# Patient Record
Sex: MALE | Race: WHITE | Employment: OTHER | URBAN - METROPOLITAN AREA
[De-identification: names, ages, dates, MRNs, and addresses within clinical notes are randomized per-mention and may not be internally consistent; named-entity substitution may affect disease eponyms.]

---

## 2017-02-16 RX ORDER — EMTRICITABINE AND TENOFOVIR DISOPROXIL FUMARATE 200; 300 MG/1; MG/1
TABLET, FILM COATED ORAL
Qty: 90 TAB | Refills: 4 | Status: SHIPPED | OUTPATIENT
Start: 2017-02-16 | End: 2018-03-20 | Stop reason: ALTCHOICE

## 2017-03-20 ENCOUNTER — HOSPITAL ENCOUNTER (OUTPATIENT)
Dept: LAB | Age: 67
Discharge: HOME | End: 2017-03-20
Payer: MEDICARE

## 2017-03-20 ENCOUNTER — OFFICE VISIT (OUTPATIENT)
Dept: HEMATOLOGY | Age: 67
End: 2017-03-20

## 2017-03-20 ENCOUNTER — TELEPHONE (OUTPATIENT)
Dept: HEMATOLOGY | Age: 67
End: 2017-03-20

## 2017-03-20 VITALS
RESPIRATION RATE: 16 BRPM | TEMPERATURE: 98 F | HEART RATE: 86 BPM | HEIGHT: 68 IN | OXYGEN SATURATION: 98 % | WEIGHT: 183 LBS | SYSTOLIC BLOOD PRESSURE: 129 MMHG | DIASTOLIC BLOOD PRESSURE: 78 MMHG | BODY MASS INDEX: 27.74 KG/M2

## 2017-03-20 DIAGNOSIS — B18.1 CHRONIC HEPATITIS B (HCC): ICD-10-CM

## 2017-03-20 DIAGNOSIS — B18.1 CHRONIC HEPATITIS B (HCC): Primary | ICD-10-CM

## 2017-03-20 LAB
ALBUMIN SERPL BCP-MCNC: 4.1 G/DL (ref 3.4–5)
ALBUMIN/GLOB SERPL: 1.5 {RATIO} (ref 0.8–1.7)
ALP SERPL-CCNC: 90 U/L (ref 45–117)
ALT SERPL-CCNC: 45 U/L (ref 16–61)
ANION GAP BLD CALC-SCNC: 9 MMOL/L (ref 3–18)
AST SERPL W P-5'-P-CCNC: 23 U/L (ref 15–37)
BASOPHILS # BLD AUTO: 0 K/UL (ref 0–0.06)
BASOPHILS # BLD: 1 % (ref 0–2)
BILIRUB DIRECT SERPL-MCNC: 0.2 MG/DL (ref 0–0.2)
BILIRUB SERPL-MCNC: 0.5 MG/DL (ref 0.2–1)
BUN SERPL-MCNC: 19 MG/DL (ref 7–18)
BUN/CREAT SERPL: 27 (ref 12–20)
CALCIUM SERPL-MCNC: 8.8 MG/DL (ref 8.5–10.1)
CHLORIDE SERPL-SCNC: 106 MMOL/L (ref 100–108)
CO2 SERPL-SCNC: 30 MMOL/L (ref 21–32)
CREAT SERPL-MCNC: 0.7 MG/DL (ref 0.6–1.3)
DIFFERENTIAL METHOD BLD: ABNORMAL
EOSINOPHIL # BLD: 0.4 K/UL (ref 0–0.4)
EOSINOPHIL NFR BLD: 8 % (ref 0–5)
ERYTHROCYTE [DISTWIDTH] IN BLOOD BY AUTOMATED COUNT: 12.8 % (ref 11.6–14.5)
GLOBULIN SER CALC-MCNC: 2.7 G/DL (ref 2–4)
GLUCOSE SERPL-MCNC: 89 MG/DL (ref 74–99)
HCT VFR BLD AUTO: 41.9 % (ref 36–48)
HGB BLD-MCNC: 14.4 G/DL (ref 13–16)
LYMPHOCYTES # BLD AUTO: 26 % (ref 21–52)
LYMPHOCYTES # BLD: 1.2 K/UL (ref 0.9–3.6)
MCH RBC QN AUTO: 33.9 PG (ref 24–34)
MCHC RBC AUTO-ENTMCNC: 34.4 G/DL (ref 31–37)
MCV RBC AUTO: 98.6 FL (ref 74–97)
MONOCYTES # BLD: 0.2 K/UL (ref 0.05–1.2)
MONOCYTES NFR BLD AUTO: 5 % (ref 3–10)
NEUTS SEG # BLD: 2.7 K/UL (ref 1.8–8)
NEUTS SEG NFR BLD AUTO: 60 % (ref 40–73)
PLATELET # BLD AUTO: 137 K/UL (ref 135–420)
PMV BLD AUTO: 8.6 FL (ref 9.2–11.8)
POTASSIUM SERPL-SCNC: 4.2 MMOL/L (ref 3.5–5.5)
PROT SERPL-MCNC: 6.8 G/DL (ref 6.4–8.2)
RBC # BLD AUTO: 4.25 M/UL (ref 4.7–5.5)
SODIUM SERPL-SCNC: 145 MMOL/L (ref 136–145)
WBC # BLD AUTO: 4.5 K/UL (ref 4.6–13.2)

## 2017-03-20 PROCEDURE — 80076 HEPATIC FUNCTION PANEL: CPT | Performed by: NURSE PRACTITIONER

## 2017-03-20 PROCEDURE — 80048 BASIC METABOLIC PNL TOTAL CA: CPT | Performed by: NURSE PRACTITIONER

## 2017-03-20 PROCEDURE — 36415 COLL VENOUS BLD VENIPUNCTURE: CPT | Performed by: NURSE PRACTITIONER

## 2017-03-20 PROCEDURE — 82107 ALPHA-FETOPROTEIN L3: CPT | Performed by: NURSE PRACTITIONER

## 2017-03-20 PROCEDURE — 85025 COMPLETE CBC W/AUTO DIFF WBC: CPT | Performed by: NURSE PRACTITIONER

## 2017-03-20 PROCEDURE — 87517 HEPATITIS B DNA QUANT: CPT | Performed by: NURSE PRACTITIONER

## 2017-03-20 NOTE — MR AVS SNAPSHOT
Visit Information Date & Time Provider Department Dept. Phone Encounter #  
 3/20/2017  1:30 PM Monika Mcintosh NP [facility] [place] of [address] [place] [address] [id number] Follow-up Instructions Return in about 6 months (around 9/20/2017). Upcoming Health Maintenance Date Due Hepatitis C Screening 1950 DTaP/Tdap/Td series (1 - Tdap) 1/20/1973 FOBT Q 1 YEAR AGE 50-75 1/20/2002 ZOSTER VACCINE AGE 60> 1/20/2012 INFLUENZA AGE 9 TO ADULT 8/1/2016 GLAUCOMA SCREENING Q2Y 1/20/2017 Pneumococcal 65+ Low/Medium Risk (1 of 2 - PCV13) 1/20/2017 MEDICARE YEARLY EXAM 1/20/2017 Allergies as of 3/20/2017  Review Complete On: 3/20/2017 By: Scottie Zapata Severity Noted Reaction Type Reactions Fish Derived  09/08/2015    Nausea and Vomiting Current Immunizations  Never Reviewed No immunizations on file. Not reviewed this visit You Were Diagnosed With   
  
 Codes Comments Chronic hepatitis B ([facility]    -  Primary ICD-10-CM: B18.1 ICD-9-CM: 070.32 Vitals BP Pulse Temp Resp Height(growth percentile) Weight(growth percentile) 129/78 (BP 1 Location: Left arm, BP Patient Position: Sitting) 86 98 °F (36.7 °C) (Tympanic) 16 5' 8\" (1.727 m) 183 lb (83 kg) SpO2 BMI Smoking Status 98% 27.83 kg/m2 Never Smoker Vitals History BMI and BSA Data Body Mass Index Body Surface Area  
 27.83 kg/m 2 2 m 2 Preferred Pharmacy Pharmacy Name Phone Matthew Dunn, [facility] [phone] Your Updated Medication List  
  
   
This list is accurate as of: 3/20/17  1:50 PM.  Always use your most recent med list.  
  
  
  
  
 [place] Curd Take  by mouth. ALLOPURINOL PO Take  by mouth. CLARITIN PO Take  by mouth. DIOVAN PO Take  by mouth.  
  
 emtricitabine-tenofovir (TDF) 200-300 mg per tablet Commonly known as:  Timbo Julio TAKE ONE TABLET DAILY FISH OIL CONCENTRATE PO Take  by mouth. LEVOTHROID PO Take  by mouth.  
  
 multivitamin capsule Take 1 Cap by mouth daily. NIASPAN EXTENDED-RELEASE PO Take  by mouth. TRAZODONE PO Take  by mouth. Follow-up Instructions Return in about 6 months (around 9/20/2017). To-Do List   
 03/20/2017 Lab:  AFP WITH AFP-L3%   
  
 03/20/2017 Lab:  CBC WITH AUTOMATED DIFF   
  
 03/20/2017 Lab:  HEPATIC FUNCTION PANEL   
  
 03/20/2017 Lab:  HEPATITIS B, QT, PCR   
  
 03/20/2017 Lab:  METABOLIC PANEL, BASIC   
  
 03/20/2017 Imaging:  [facility]! Dear Tony Bruce: Thank you for requesting a [employer] account. Our records indicate that you already have an active [employer] account. You can access your account anytime at [email or web address] Did you know that you can access your hospital and ER discharge instructions at any time in [employer]? You can also review all of your test results from your hospital stay or ER visit. Additional Information If you have questions, please visit the Frequently Asked Questions section of the [employer] website at [email or web address] Remember, [employer] is NOT to be used for urgent needs. For medical emergencies, dial 911. Now available from your iPhone and Android! Please provide this summary of care documentation to your next provider. Your primary care clinician is listed as CHEY LLOYD. If you have any questions after today's visit, please call [phone].

## 2017-03-20 NOTE — PROGRESS NOTES
2 Karen Reed MD April G Patrice, NP [address], NP        at [address] [facility], [address] [address].     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 The active problem list, all pertinent past medical history, medications, liver histology, endoscopic studies, radiologic findings and laboratory findings related to the liver disorder were reviewed with the patient. Mr. Radha Rolon has been feeling well since his last office visit six months ago. Today he has no physical complaints. Mr. Radha Rolon continues to take Truvada without any noticeable side effects. His HBV DNA has been less than 20 IU on Truvada and his transaminases normal.  He completes all daily living activities without any functional limitations. He denies any of the following symptoms: noted fatigue, diffuse abdominal pain, nausea, vomiting, changes in bowel habitis, arthralgias, myalgias, yellowing of the eyes, yellowing of the skin, itchng, dark urine, problems concentrating, focusing or staying on task, swelling of the abdomen, swelling of the lower extremities, hematemesis and hematochezia. Patient Active Problem List    Diagnosis Date Noted    Chronic hepatitis B. Treated with Pegasys from 1/30/04 - 07/15/04.  Hypothyroidism     HBV. Participated in a clinical trial utilizing emtricitabine and Clevudine, relapse after treatment.  HBV.  started Epivir 08/05 with only a 2 log drop in HBV DNA as of 03/06.   Adefovir substituted 06/06     Hypertension     Glaucoma     Hypercholesteremia     Gout      Current Outpatient Prescriptions   Medication Sig Dispense Refill    emtricitabine-tenofovir, TDF, (TRUVADA) 200-300 mg per tablet TAKE ONE TABLET DAILY 90 Tab 4    VALSARTAN (DIOVAN PO) Take  by mouth.  LORATADINE (CLARITIN PO) Take  by mouth.  TRAZODONE HCL (TRAZODONE PO) Take  by mouth.  NAPROXEN SODIUM (ALEVE PO) Take  by mouth.  LEVOTHYROXINE SODIUM (LEVOTHROID PO) Take  by mouth.  NIACIN (NIASPAN EXTENDED-RELEASE PO) Take  by mouth.  OMEGA-3 FATTY ACIDS (FISH OIL CONCENTRATE PO) Take  by mouth.  ALLOPURINOL PO Take  by mouth.  multivitamin capsule Take 1 Cap by mouth daily. Allergies   Allergen Reactions    Fish Derived Nausea and Vomiting      SYSTEM REVIEW NOT RELATED TO LIVER DISEASE OR REVIEWED ABOVE:  Constitution systems: Negative for fever, chills, weight gain, weight loss. Eyes: Negative for visual changes. ENT: Negative for sore throat, painful swallowing. Respiratory: Negative for cough, hemoptysis, SOB. Cardiology: Negative for chest pain, palpitations. GI:  Negative for constipation or diarrhea. : Negative for urinary frequency, dysuria, hematuria, nocturia. Skin: Negative for rash. Hematology: Negative for easy bruising, blood clots. Musculo-skelatal: Negative for back pain, muscle pain, weakness. Neurologic: Negative for headaches, dizziness, vertigo, memory problems not related to HE. Psychology: Negative for anxiety, depression. Family/Social History:  He does not drink alcohol and has history of intranasal drug use. He was possibly exposed to hepatitis B sometime in the 1970s. His family is unremarkable for liver disease. He is single with no children. He is a retired .       Objective:     Visit Vitals    /78 (BP 1 Location: Left arm, BP Patient Position: Sitting)    Pulse 86    Temp 98 °F (36.7 °C) (Tympanic)    Resp 16    Ht 5' 8\" (1.727 m)    Wt 183 lb (83 kg)    SpO2 98%    BMI 27.83 kg/m2       General appearance: no distress  Eyes: sclera anicteric  ENT: no oral lesions, thyroid normal  Nodes: no adenopathy  Skin: no spider angiomata, jaundice, palmar erythema or xanthalasma  Respiratory: clear to auscultation bilaterally  Cardiovascular: regular heart rate, no murmurs, no JVD, no edema  Abdomen: soft, non-tender, liver size normal to percussion/palpation. Spleen not palpable. No obvious ascites  Extremities: no muscle wasting, no gross arthritic changes  : not examined  Neurologic: alert and oriented, cranial nerves grossly intact, no asterixis    LAB    [facility]iver [facility] Latest Ref Rng & Units 9/20/2016 3/8/2016 9/8/2015   WBC 4.6 - 13.2 K/uL 4.3 (L) 3.2 (L) 3.8   ANC 1.8 - 8.0 K/UL 2.6 2.1 2.4   HGB 13.0 - 16.0 g/dL 15.3 14.5 14.8    - 420 K/uL 154 126 (L) 139 (L)   AST 15 - 37 U/L 16 15 18   ALT 16 - 61 U/L 29 33 28   Alk Phos 45 - 117 U/L 86 98 110   Bili, Total 0.2 - 1.0 MG/DL 0.4 0.5 0.6   Bili, Direct 0.0 - 0.2 MG/DL 0.1 0.1 0.19   Albumin 3.4 - 5.0 g/dL 4.3 4.2 4.8   BUN 7.0 - 18 MG/DL 20 (H) 20 (H) 17   Creat 0.6 - 1.3 MG/DL 0.80 0.81 0.86   Na 136 - 145 mmol/L 142 142 142   K 3.5 - 5.5 mmol/L 3.8 3.8 4.0   Cl 100 - 108 mmol/L 101 103 99   CO2 21 - 32 mmol/L 31 27 26   Glucose 74 - 99 mg/dL 85 94 113 (H)       Serologies and Other Pertinent Laboratories:  09/10:  HB e antigen negative, HB e antibody positive, HB surface antigen positive, HB surface antibody negative. Liver Biopsy:  2000:  Knodell score 13 (5789). 2002:  Bridging fibrosis with characteristics suggestive of WADSWORTH. Endoscopic Procedures:  None available. Radiology:  01/10:  Abdominal ultrasound - liver is diffusely echogenic consistent with fatty infiltration. No focal hepatic abnormalities. 01/12:  Abdominal ultrasound - liver is diffusely echogenic consistent with fatty infiltration. No focal hepatic abnormalities. 01/13:  Abdominal ultrasound - liver is diffusely echogenic consistent with fatty infiltration.   No focal hepatic abnormalities. 01/2014: Abdominal ultrasound - liver is diffusely echogenic consistent with fatty infiltration. No focal hepatic abnormalities. 01/2015:  Abdominal ultrasound - liver is diffusely echogenic consistent with fatty infiltration. No focal hepatic abnormalities. 10/2015: Abdominal ultrasound - liver is diffusely echogenic consistent with fatty infiltration. No focal hepatic abnormalities. 3/2016:  Abdominal ultrasound. Moderately increased hepatic echogenicity likely related to steatosis and/or underlying chronic liver disease. No evidence of a liver mass. 9/2016:  Ultrasound of the abdomen. There is increased echogenicity of the liver, similar to prior study. There is a new hypoechoic area within the right lobe the liver near the gallbladder fossa measuring 1.3 x 1.3 x 0.5 cm. Normal direction of blood flow in the portal vein. The liver measures 18.5 cm in length. 10/2016:  MRI of the liver. Hepatic steatosis. Small area of focal fatty sparing adjacent to gallbladder  fossa, very likely accounts for the finding seen on ultrasound. 2. 3 tiny hepatic lesions are too small to definitively characterize, though have features suggestive of benign hemangiomas. Other Testing:  None available. Assessment/Plan:     1. 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 Currently on Truvada. HBV DNA less that 20 iu/ml at last office visit. CBC, BMP, hepatic panel, and HBV DNA ordered today. Continue Truvada. In January 2017 we will switch him to Viread when it becomes generic and will be cheaper. 2.  [address] screening. AFP and abdominal ultrasound ordered today. 3.  Alcohol counseling provided. 4. The need for vaccination against viral hepatitis A will be assessed with serologic and instituted as appropriate. 5. All of the above issues were discussed with the patient. All questions were answered. The patient expressed a clear understanding of the above.     6.  Dr. Anaya Campbell was available during this visit. 7.  Follow-up at the [place] [address] in 6 months.     [address],   [facility] [place] of [facility] [place] Edwin Larios, [address]  [address]  [phone]

## 2017-03-21 LAB
AFP L3 MFR SERPL: NORMAL % (ref 0–9.9)
AFP SERPL-MCNC: 3.1 NG/ML (ref 0–8)
HBV DNA SERPL NAA+PROBE-ACNC: NORMAL IU/ML
HBV DNA SERPL NAA+PROBE-LOG IU: NORMAL LOG10IU/ML
TEST INFORMATION: NORMAL

## 2017-04-11 ENCOUNTER — HOSPITAL ENCOUNTER (OUTPATIENT)
Dept: ULTRASOUND IMAGING | Age: 67
Discharge: HOME | End: 2017-04-11
Attending: NURSE PRACTITIONER
Payer: MEDICARE

## 2017-04-11 DIAGNOSIS — B18.1 CHRONIC HEPATITIS B (HCC): ICD-10-CM

## 2017-04-11 PROCEDURE — 76705 ECHO EXAM OF ABDOMEN: CPT

## 2017-09-20 ENCOUNTER — OFFICE VISIT (OUTPATIENT)
Dept: HEMATOLOGY | Age: 67
End: 2017-09-20

## 2017-09-20 ENCOUNTER — HOSPITAL ENCOUNTER (OUTPATIENT)
Dept: LAB | Age: 67
Discharge: HOME | End: 2017-09-20
Payer: MEDICARE

## 2017-09-20 VITALS
TEMPERATURE: 96.4 F | BODY MASS INDEX: 27.74 KG/M2 | DIASTOLIC BLOOD PRESSURE: 76 MMHG | RESPIRATION RATE: 16 BRPM | SYSTOLIC BLOOD PRESSURE: 129 MMHG | OXYGEN SATURATION: 97 % | HEIGHT: 68 IN | HEART RATE: 79 BPM | WEIGHT: 183 LBS

## 2017-09-20 DIAGNOSIS — B19.10 HEP B W/O COMA: Primary | ICD-10-CM

## 2017-09-20 DIAGNOSIS — B19.10 HEP B W/O COMA: ICD-10-CM

## 2017-09-20 LAB
ALBUMIN SERPL-MCNC: 3.9 G/DL (ref 3.4–5)
ALBUMIN/GLOB SERPL: 1.4 {RATIO} (ref 0.8–1.7)
ALP SERPL-CCNC: 87 U/L (ref 45–117)
ALT SERPL-CCNC: 36 U/L (ref 16–61)
ANION GAP SERPL CALC-SCNC: 5 MMOL/L (ref 3–18)
AST SERPL-CCNC: 22 U/L (ref 15–37)
BASOPHILS # BLD: 0 K/UL (ref 0–0.06)
BASOPHILS NFR BLD: 0 % (ref 0–2)
BILIRUB DIRECT SERPL-MCNC: 0.2 MG/DL (ref 0–0.2)
BILIRUB SERPL-MCNC: 0.5 MG/DL (ref 0.2–1)
BUN SERPL-MCNC: 23 MG/DL (ref 7–18)
BUN/CREAT SERPL: 29 (ref 12–20)
CALCIUM SERPL-MCNC: 8.5 MG/DL (ref 8.5–10.1)
CHLORIDE SERPL-SCNC: 104 MMOL/L (ref 100–108)
CO2 SERPL-SCNC: 30 MMOL/L (ref 21–32)
CREAT SERPL-MCNC: 0.8 MG/DL (ref 0.6–1.3)
DIFFERENTIAL METHOD BLD: ABNORMAL
EOSINOPHIL # BLD: 0.3 K/UL (ref 0–0.4)
EOSINOPHIL NFR BLD: 7 % (ref 0–5)
ERYTHROCYTE [DISTWIDTH] IN BLOOD BY AUTOMATED COUNT: 13.1 % (ref 11.6–14.5)
GLOBULIN SER CALC-MCNC: 2.8 G/DL (ref 2–4)
GLUCOSE SERPL-MCNC: 97 MG/DL (ref 74–99)
HCT VFR BLD AUTO: 39.7 % (ref 36–48)
HGB BLD-MCNC: 13.6 G/DL (ref 13–16)
LYMPHOCYTES # BLD: 1.2 K/UL (ref 0.9–3.6)
LYMPHOCYTES NFR BLD: 29 % (ref 21–52)
MCH RBC QN AUTO: 33.3 PG (ref 24–34)
MCHC RBC AUTO-ENTMCNC: 34.3 G/DL (ref 31–37)
MCV RBC AUTO: 97.3 FL (ref 74–97)
MONOCYTES # BLD: 0.3 K/UL (ref 0.05–1.2)
MONOCYTES NFR BLD: 7 % (ref 3–10)
NEUTS SEG # BLD: 2.3 K/UL (ref 1.8–8)
NEUTS SEG NFR BLD: 57 % (ref 40–73)
PLATELET # BLD AUTO: 148 K/UL (ref 135–420)
PMV BLD AUTO: 8.7 FL (ref 9.2–11.8)
POTASSIUM SERPL-SCNC: 3.4 MMOL/L (ref 3.5–5.5)
PROT SERPL-MCNC: 6.7 G/DL (ref 6.4–8.2)
RBC # BLD AUTO: 4.08 M/UL (ref 4.7–5.5)
SODIUM SERPL-SCNC: 139 MMOL/L (ref 136–145)
WBC # BLD AUTO: 4 K/UL (ref 4.6–13.2)

## 2017-09-20 PROCEDURE — 87517 HEPATITIS B DNA QUANT: CPT | Performed by: NURSE PRACTITIONER

## 2017-09-20 PROCEDURE — 80076 HEPATIC FUNCTION PANEL: CPT | Performed by: NURSE PRACTITIONER

## 2017-09-20 PROCEDURE — 85025 COMPLETE CBC W/AUTO DIFF WBC: CPT | Performed by: NURSE PRACTITIONER

## 2017-09-20 PROCEDURE — 82107 ALPHA-FETOPROTEIN L3: CPT | Performed by: NURSE PRACTITIONER

## 2017-09-20 PROCEDURE — 80048 BASIC METABOLIC PNL TOTAL CA: CPT | Performed by: NURSE PRACTITIONER

## 2017-09-20 PROCEDURE — 36415 COLL VENOUS BLD VENIPUNCTURE: CPT | Performed by: NURSE PRACTITIONER

## 2017-09-20 RX ORDER — AMLODIPINE BESYLATE 5 MG/1
TABLET ORAL
COMMUNITY
Start: 2017-07-19

## 2017-09-20 RX ORDER — TENOFOVIR DISOPROXIL FUMARATE 300 MG/1
300 TABLET, FILM COATED ORAL DAILY
Qty: 90 TAB | Refills: 3 | Status: SHIPPED | OUTPATIENT
Start: 2017-09-20 | End: 2018-08-08 | Stop reason: SDUPTHER

## 2017-09-20 RX ORDER — METRONIDAZOLE 10 MG/G
GEL TOPICAL
COMMUNITY
Start: 2017-09-08

## 2017-09-20 NOTE — MR AVS SNAPSHOT
Visit Information Date & Time Provider Department Dept. Phone Encounter #  
 9/20/2017 11:00 AM XAVI Romovägen 13 of [facility] [id number] Follow-up Instructions Return in about 6 months (around 3/20/2018). Upcoming Health Maintenance Date Due Hepatitis C Screening 1950 DTaP/Tdap/Td series (1 - Tdap) 1/20/1973 FOBT Q 1 YEAR AGE 50-75 1/20/2002 ZOSTER VACCINE AGE 60> 11/20/2011 GLAUCOMA SCREENING Q2Y 1/20/2017 Pneumococcal 65+ Low/Medium Risk (1 of 2 - PCV13) 1/20/2017 MEDICARE YEARLY EXAM 1/20/2017 INFLUENZA AGE 9 TO ADULT 8/1/2017 Allergies as of 9/20/2017  Review Complete On: 9/20/2017 By: Denise Barakat Severity Noted Reaction Type Reactions Fish Derived  09/08/2015    Nausea and Vomiting Current Immunizations  Never Reviewed No immunizations on file. Not reviewed this visit You Were Diagnosed With   
  
 Codes Comments Hep B w/o coma    -  Primary ICD-10-CM: B19.10 ICD-9-CM: 070.30 Vitals BP Pulse Temp Resp Height(growth percentile) 129/76 (BP 1 Location: Left arm, BP Patient Position: Sitting) 79 96.4 °F (35.8 °C) (Tympanic) 16 5' 8\" (1.727 m) Weight(growth percentile) SpO2 BMI Smoking Status 183 lb (83 kg) 97% 27.83 kg/m2 Never Smoker BMI and BSA Data Body Mass Index Body Surface Area  
 27.83 kg/m 2 2 m 2 Preferred Pharmacy Pharmacy Name Phone [address] [facility] [phone] Your Updated Medication List  
  
   
This list is accurate as of: 9/20/17 11:17 AM.  Always use your most recent med list.  
  
  
  
  
 [place] Schiller Take  by mouth. ALLOPURINOL PO Take  by mouth. amLODIPine 5 mg tablet Commonly known as:  Sherrye Acron CLARITIN PO Take  by mouth. DIOVAN PO Take  by mouth. emtricitabine-tenofovir (TDF) 200-300 mg per tablet Commonly known as:  Eritre[place] Drones TAKE ONE TABLET DAILY FISH OIL CONCENTRATE PO Take  by mouth. LEVOTHROID PO Take  by mouth.  
  
 metroNIDAZOLE 1 % Glwp  
  
 multivitamin capsule Take 1 Cap by mouth daily. NIASPAN EXTENDED-RELEASE PO Take  by mouth. tenofovir DISOPROXIL FUMARATE 300 mg tablet Commonly known as:  Mark Gens Take 1 Tab by mouth daily. TRAZODONE PO Take  by mouth. Prescriptions Printed Refills  
 tenofovir DISOPROXIL FUMARATE (VIREAD) 300 mg tablet 3 Sig: Take 1 Tab by mouth daily. Class: Print Route: Oral  
  
Follow-up Instructions Return in about 6 months (around 3/20/2018). To-Do List   
 09/20/2017 Lab:  AFP WITH AFP-L3%   
  
 09/20/2017 Lab:  CBC WITH AUTOMATED DIFF   
  
 09/20/2017 Lab:  HEPATIC FUNCTION PANEL   
  
 09/20/2017 Lab:  HEPATITIS B, QT, PCR   
  
 09/20/2017 Lab:  METABOLIC PANEL, BASIC   
  
 10/01/2017 Imaging:  US [facility] Introducing [facility] & HEALTH SERVICES! Dear Karla Chan: Thank you for requesting a [employer] account. Our records indicate that you already have an active [employer] account. You can access your account anytime at [email or web address] Did you know that you can access your hospital and ER discharge instructions at any time in [employer]? You can also review all of your test results from your hospital stay or ER visit. Additional Information If you have questions, please visit the Frequently Asked Questions section of the [employer] website at [email or web address] Remember, [employer] is NOT to be used for urgent needs. For medical emergencies, dial 911. Now available from your iPhone and Android! Please provide this summary of care documentation to your next provider. Your primary care clinician is listed as Maggi Sloan.  If you have any questions after today's visit, please call [phone].

## 2017-09-20 NOTE — PROGRESS NOTES
[address] MD David, [address]       XAVI Mendosa PA-C Lauree Funk, [facility]   XAVI Medina NP        at [address]     [address] [place] [address], [address] [address]     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 The active problem list, all pertinent past medical history, medications, liver histology, endoscopic studies, radiologic findings and laboratory findings related to the liver disorder were reviewed with the patient. Most recent was performed with ultrasound in 04/2017. Diffuse hepatic echogenicity suggesting either hepatic steatosis or other  chronic hepatocellular disease. Stable small area of hypoechogenicity along gallbladder fossa, likely focal fatty sparing. The most recent laboratory studies indicate that the liver transaminases are normal, alkaline phosphatase is normal, tests of hepatic synthetic and metabolic function are normal, and the platelet count is normal.      HBV is being treated with Truvada. HBV DNA in 03/2017 was undetectable. The patient has no complaints which can be attributed to liver disease. The patient completes all daily activities without any functional limitations.  The patient has not experienced fatigue, fevers, chills, shortness of breath, chest pain, pain in the right side over the liver, diffuse abdominal pain, nausea, vomiting, constipation, diarrhrea, dry eyes, dry mouth, arthralgias, myalgias, yellowing of the eyes or skin, itching, dark urine, problems concentrating, swelling of the abdomen, swelling of the lower extremities, hematemesis, or hematochezia. Patient Active Problem List    Diagnosis Date Noted    Chronic hepatitis B. Treated with Pegasys from 1/30/04 - 07/15/04.  Hypothyroidism     HBV. Participated in a clinical trial utilizing emtricitabine and Clevudine, relapse after treatment.  HBV.  started Epivir 08/05 with only a 2 log drop in HBV DNA as of 03/06. Adefovir substituted 06/06     Hypertension     Glaucoma     Hypercholesteremia     Gout      Current Outpatient Prescriptions   Medication Sig Dispense Refill    amLODIPine (NORVASC) 5 mg tablet       emtricitabine-tenofovir, TDF, (TRUVADA) 200-300 mg per tablet TAKE ONE TABLET DAILY 90 Tab 4    VALSARTAN (DIOVAN PO) Take  by mouth.  LORATADINE (CLARITIN PO) Take  by mouth.  TRAZODONE HCL (TRAZODONE PO) Take  by mouth.  NAPROXEN SODIUM (ALEVE PO) Take  by mouth.  LEVOTHYROXINE SODIUM (LEVOTHROID PO) Take  by mouth.  NIACIN (NIASPAN EXTENDED-RELEASE PO) Take  by mouth.  OMEGA-3 FATTY ACIDS (FISH OIL CONCENTRATE PO) Take  by mouth.  ALLOPURINOL PO Take  by mouth.  multivitamin capsule Take 1 Cap by mouth daily.  metroNIDAZOLE 1 % glwp        Allergies   Allergen Reactions    Fish Derived Nausea and Vomiting      SYSTEM REVIEW NOT RELATED TO LIVER DISEASE OR REVIEWED ABOVE:  Constitution systems: Negative for fever, chills, weight gain, weight loss. Eyes: Negative for visual changes. ENT: Negative for sore throat, painful swallowing. Respiratory: Negative for cough, hemoptysis, SOB. Cardiology: Negative for chest pain, palpitations. GI:  Negative for constipation or diarrhea. : Negative for urinary frequency, dysuria, hematuria, nocturia. Skin: Negative for rash. Hematology: Negative for easy bruising, blood clots. Musculo-skelatal: Negative for back pain, muscle pain, weakness. Neurologic: Negative for headaches, dizziness, vertigo, memory problems not related to HE.   Psychology: Negative for anxiety, depression. Family/Social History:  He does not drink alcohol and has history of intranasal drug use. He was possibly exposed to hepatitis B sometime in the 1970s. His family is unremarkable for liver disease. He is single with no children. He is a retired . Retired in 2005. Objective:     Visit Vitals    /76 (BP 1 Location: Left arm, BP Patient Position: Sitting)    Pulse 79    Temp 96.4 °F (35.8 °C) (Tympanic)    Resp 16    Ht 5' 8\" (1.727 m)    Wt 183 lb (83 kg)    SpO2 97%    BMI 27.83 kg/m2       General appearance: no distress  Eyes: sclera anicteric  ENT: no oral lesions, thyroid normal  Nodes: no adenopathy  Skin: no spider angiomata, jaundice, palmar erythema or xanthalasma  Respiratory: clear to auscultation bilaterally  Cardiovascular: regular heart rate, no murmurs, no JVD, no edema  Abdomen: soft, non-tender, liver size normal to percussion/palpation. Spleen not palpable.  No obvious ascites  Extremities: no muscle wasting, no gross arthritic changes  : not examined  Neurologic: alert and oriented, cranial nerves grossly intact, no asterixis    LABORATORY STUDIES:  [facility] [place] of [address] [place] [address] 3/20/2017 9/20/2016 3/8/2016   WBC 4.6 - 13.2 K/uL 4.5 (L) 4.3 (L) 3.2 (L)   ANC 1.8 - 8.0 K/UL 2.7 2.6 2.1   HGB 13.0 - 16.0 g/dL 14.4 15.3 14.5    - 420 K/uL 137 154 126 (L)   AST 15 - 37 U/L 23 16 15   ALT 16 - 61 U/L 45 29 33   Alk Phos 45 - 117 U/L 90 86 98   Bili, Total 0.2 - 1.0 MG/DL 0.5 0.4 0.5   Bili, Direct 0.0 - 0.2 MG/DL 0.2 0.1 0.1   Albumin 3.4 - 5.0 g/dL 4.1 4.3 4.2   BUN 7.0 - 18 MG/DL 19 (H) 20 (H) 20 (H)   Creat 0.6 - 1.3 MG/DL 0.70 0.80 0.81   Na 136 - 145 mmol/L 145 142 142   K 3.5 - 5.5 mmol/L 4.2 3.8 3.8   Cl 100 - 108 mmol/L 106 101 103   CO2 21 - 32 mmol/L 30 31 27   Glucose 74 - 99 mg/dL 89 85 94     Serologies and Other Pertinent Laboratories:  09/10:  HB e antigen negative, HB e antibody positive, HB surface antigen positive, HB surface antibody negative. Liver Biopsy:  2000:  Knodell score 13 (9752). 2002:  Bridging fibrosis with characteristics suggestive of WADSWORTH. Endoscopic Procedures:  None available. Radiology:  01/10:  Abdominal ultrasound - liver is diffusely echogenic consistent with fatty infiltration. No focal hepatic abnormalities. 01/12:  Abdominal ultrasound - liver is diffusely echogenic consistent with fatty infiltration. No focal hepatic abnormalities. 01/13:  Abdominal ultrasound - liver is diffusely echogenic consistent with fatty infiltration. No focal hepatic abnormalities. 01/2014: Abdominal ultrasound - liver is diffusely echogenic consistent with fatty infiltration. No focal hepatic abnormalities. 01/2015:  Abdominal ultrasound - liver is diffusely echogenic consistent with fatty infiltration. No focal hepatic abnormalities. 10/2015: Abdominal ultrasound - liver is diffusely echogenic consistent with fatty infiltration. No focal hepatic abnormalities. 3/2016:  Abdominal ultrasound. Moderately increased hepatic echogenicity likely related to steatosis and/or underlying chronic liver disease. No evidence of a liver mass. 9/2016:  Ultrasound of the abdomen. There is increased echogenicity of the liver, similar to prior study. There is a new hypoechoic area within the right lobe the liver near the gallbladder fossa measuring 1.3 x 1.3 x 0.5 cm. Normal direction of blood flow in the portal vein. The liver measures 18.5 cm in length. 10/2016:  MRI of the liver. Hepatic steatosis. Small area of focal fatty sparing adjacent to gallbladder  fossa, very likely accounts for the finding seen on ultrasound. 2. 3 tiny hepatic lesions are too small to definitively characterize, though have features suggestive of benign hemangiomas. 04/2017. Ultrasound of the liver.   Diffuse hepatic echogenicity suggesting either hepatic steatosis or other chronic hepatocellular disease. Other Testing:  None available. 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 The most recent laboratory studies indicate that the liver transaminases are normal, alkaline phosphatase is normal, tests of hepatic synthetic and metabolic function are normal, and the platelet count is normal.  Will perform laboratory testing to monitor liver function and degree of liver injury. This will include hepatic panel, a CBC w/ diff, a BMP, an AFP-L3%, and an HBV DNA.      HBV. HBV is being treated with Truvada. HBV DNA in 03/2017 was undetectable. He would to switch to Viread for cost effectiveness. He states that he has \"couple of months of Truvada left\". I provided him with a prescription for Viread to be filled when he compleetes the Truvada. [address] screening. I explained that he should [address] screening with ultrasound and AFP-L3%  every 6 months henceforth. Alcohol counseling provided. The need for vaccination against viral hepatitis A will be assessed with serologic and instituted as appropriate. All of the above issues were discussed with the patient. All questions were answered. The patient expressed a clear understanding of the above. Dr. Narendra Crespo was available during this visit. Follow-up at the [facility] in 6 months.     Sulma Neely NP   Liver [place] of [address] [place] [address], [address] [place] [address]   [phone]

## 2017-09-21 LAB
AFP L3 MFR SERPL: NORMAL % (ref 0–9.9)
AFP SERPL-MCNC: 2.1 NG/ML (ref 0–8)
HBV DNA SERPL NAA+PROBE-ACNC: NORMAL IU/ML
HBV DNA SERPL NAA+PROBE-LOG IU: NORMAL LOG10IU/ML
TEST INFORMATION: NORMAL

## 2017-09-26 ENCOUNTER — HOSPITAL ENCOUNTER (OUTPATIENT)
Dept: ULTRASOUND IMAGING | Age: 67
Discharge: HOME | End: 2017-09-26
Payer: MEDICARE

## 2017-09-26 DIAGNOSIS — B19.10 HEP B W/O COMA: ICD-10-CM

## 2017-09-26 PROCEDURE — 0346T US ABD LTD W ELASTOGRAPHY: CPT

## 2018-03-20 ENCOUNTER — OFFICE VISIT (OUTPATIENT)
Dept: HEMATOLOGY | Age: 68
End: 2018-03-20

## 2018-03-20 ENCOUNTER — HOSPITAL ENCOUNTER (OUTPATIENT)
Dept: LAB | Age: 68
Discharge: HOME | End: 2018-03-20
Payer: MEDICARE

## 2018-03-20 VITALS
DIASTOLIC BLOOD PRESSURE: 75 MMHG | WEIGHT: 168 LBS | BODY MASS INDEX: 25.46 KG/M2 | RESPIRATION RATE: 26 BRPM | HEIGHT: 68 IN | SYSTOLIC BLOOD PRESSURE: 121 MMHG | HEART RATE: 87 BPM

## 2018-03-20 DIAGNOSIS — B19.10 HEP B W/O COMA: ICD-10-CM

## 2018-03-20 DIAGNOSIS — B19.10 HEP B W/O COMA: Primary | ICD-10-CM

## 2018-03-20 LAB
ALBUMIN SERPL-MCNC: 3.8 G/DL (ref 3.4–5)
ALBUMIN/GLOB SERPL: 1.4 {RATIO} (ref 0.8–1.7)
ALP SERPL-CCNC: 76 U/L (ref 45–117)
ALT SERPL-CCNC: 38 U/L (ref 16–61)
ANION GAP SERPL CALC-SCNC: 10 MMOL/L (ref 3–18)
AST SERPL-CCNC: 16 U/L (ref 15–37)
BASOPHILS # BLD: 0 K/UL (ref 0–0.06)
BASOPHILS NFR BLD: 1 % (ref 0–2)
BILIRUB DIRECT SERPL-MCNC: 0.1 MG/DL (ref 0–0.2)
BILIRUB SERPL-MCNC: 0.4 MG/DL (ref 0.2–1)
BUN SERPL-MCNC: 24 MG/DL (ref 7–18)
BUN/CREAT SERPL: 26 (ref 12–20)
CALCIUM SERPL-MCNC: 8.8 MG/DL (ref 8.5–10.1)
CHLORIDE SERPL-SCNC: 106 MMOL/L (ref 100–108)
CO2 SERPL-SCNC: 28 MMOL/L (ref 21–32)
CREAT SERPL-MCNC: 0.91 MG/DL (ref 0.6–1.3)
DIFFERENTIAL METHOD BLD: ABNORMAL
EOSINOPHIL # BLD: 0.3 K/UL (ref 0–0.4)
EOSINOPHIL NFR BLD: 9 % (ref 0–5)
ERYTHROCYTE [DISTWIDTH] IN BLOOD BY AUTOMATED COUNT: 13.2 % (ref 11.6–14.5)
GLOBULIN SER CALC-MCNC: 2.8 G/DL (ref 2–4)
GLUCOSE SERPL-MCNC: 93 MG/DL (ref 74–99)
HCT VFR BLD AUTO: 43.2 % (ref 36–48)
HGB BLD-MCNC: 14.6 G/DL (ref 13–16)
INR PPP: 0.9 (ref 0.8–1.2)
LYMPHOCYTES # BLD: 1.1 K/UL (ref 0.9–3.6)
LYMPHOCYTES NFR BLD: 31 % (ref 21–52)
MCH RBC QN AUTO: 32.9 PG (ref 24–34)
MCHC RBC AUTO-ENTMCNC: 33.8 G/DL (ref 31–37)
MCV RBC AUTO: 97.3 FL (ref 74–97)
MONOCYTES # BLD: 0.3 K/UL (ref 0.05–1.2)
MONOCYTES NFR BLD: 10 % (ref 3–10)
NEUTS SEG # BLD: 1.8 K/UL (ref 1.8–8)
NEUTS SEG NFR BLD: 49 % (ref 40–73)
PLATELET # BLD AUTO: 146 K/UL (ref 135–420)
PMV BLD AUTO: 8.7 FL (ref 9.2–11.8)
POTASSIUM SERPL-SCNC: 4.1 MMOL/L (ref 3.5–5.5)
PROT SERPL-MCNC: 6.6 G/DL (ref 6.4–8.2)
PROTHROMBIN TIME: 11.7 SEC (ref 11.5–15.2)
RBC # BLD AUTO: 4.44 M/UL (ref 4.7–5.5)
SODIUM SERPL-SCNC: 144 MMOL/L (ref 136–145)
WBC # BLD AUTO: 3.6 K/UL (ref 4.6–13.2)

## 2018-03-20 PROCEDURE — 36415 COLL VENOUS BLD VENIPUNCTURE: CPT | Performed by: NURSE PRACTITIONER

## 2018-03-20 PROCEDURE — 80076 HEPATIC FUNCTION PANEL: CPT | Performed by: NURSE PRACTITIONER

## 2018-03-20 PROCEDURE — 80048 BASIC METABOLIC PNL TOTAL CA: CPT | Performed by: NURSE PRACTITIONER

## 2018-03-20 PROCEDURE — 87350 HEPATITIS BE AG IA: CPT | Performed by: NURSE PRACTITIONER

## 2018-03-20 PROCEDURE — 85025 COMPLETE CBC W/AUTO DIFF WBC: CPT | Performed by: NURSE PRACTITIONER

## 2018-03-20 PROCEDURE — 87517 HEPATITIS B DNA QUANT: CPT | Performed by: NURSE PRACTITIONER

## 2018-03-20 PROCEDURE — 85610 PROTHROMBIN TIME: CPT | Performed by: NURSE PRACTITIONER

## 2018-03-20 PROCEDURE — 82107 ALPHA-FETOPROTEIN L3: CPT | Performed by: NURSE PRACTITIONER

## 2018-03-20 RX ORDER — LOSARTAN POTASSIUM AND HYDROCHLOROTHIAZIDE 12.5; 1 MG/1; MG/1
TABLET ORAL
COMMUNITY
Start: 2018-01-24

## 2018-03-20 NOTE — MR AVS SNAPSHOT
[address] [place] [address] 
[phone] Patient: Petr Jeffers Sr. MRN: [id number] Visit Information Date & Time Provider Department Dept. Phone Encounter #  
 3/20/2018 11:00 AM XAVI Childs [address] of  [address] [id number] Follow-up Instructions Return in about 6 months (around 9/20/2018). Upcoming Health Maintenance Date Due Hepatitis C Screening 1950 DTaP/Tdap/Td series (1 - Tdap) 1/20/1973 FOBT Q 1 YEAR AGE 50-75 1/20/2002 ZOSTER VACCINE AGE 60> 11/20/2011 GLAUCOMA SCREENING Q2Y 1/20/2017 Pneumococcal 65+ Low/Medium Risk (1 of 2 - PCV13) 1/20/2017 Influenza Age 5 to Adult 8/1/2017 MEDICARE YEARLY EXAM 3/14/2018 Allergies as of 3/20/2018  Review Complete On: 9/20/2017 By: Birdie Gaines Severity Noted Reaction Type Reactions Fish Derived  09/08/2015    Nausea and Vomiting Current Immunizations  Never Reviewed No immunizations on file. Not reviewed this visit You Were Diagnosed With   
  
 Codes Comments Hep B w/o coma    -  Primary ICD-10-CM: B19.10 ICD-9-CM: 070.30 Vitals BP Pulse Resp Height(growth percentile) Weight(growth percentile) BMI  
 121/75 (BP 1 Location: Left arm, BP Patient Position: Sitting) 87 26 5' 8\" (1.727 m) 168 lb (76.2 kg) 25.54 kg/m2 Smoking Status Never Smoker Vitals History BMI and BSA Data Body Mass Index Body Surface Area 25.54 kg/m 2 1.91 m 2 Preferred Pharmacy Pharmacy Name Phone [address] [facility] [phone] Your Updated Medication List  
  
   
This list is accurate as of 3/20/18 11:27 AM.  Always use your most recent med list.  
  
  
  
  
 Janett Hammans Take  by mouth. ALLOPURINOL PO Take  by mouth. amLODIPine 5 mg tablet Commonly known as:  Zayra Muniz CLARITIN PO Take  by mouth. FISH OIL CONCENTRATE PO Take  by mouth. LEVOTHROID PO Take  by mouth.  
  
 losartan-hydroCHLOROthiazide 100-12.5 mg per tablet Commonly known as:  HYZAAR  
  
 metroNIDAZOLE 1 % Glwp  
  
 multivitamin capsule Take 1 Cap by mouth daily. NIASPAN EXTENDED-RELEASE PO Take  by mouth. tenofovir DISOPROXIL FUMARATE 300 mg tablet Commonly known as:  Friddie Grills Take 1 Tab by mouth daily. TRAZODONE PO Take  by mouth. Follow-up Instructions Return in about 6 months (around 9/20/2018). To-Do List   
 03/20/2018 Lab:  AFP WITH AFP-L3%   
  
 03/20/2018 Lab:  CBC WITH AUTOMATED DIFF   
  
 03/20/2018 Lab:  HEPATIC FUNCTION PANEL   
  
 03/20/2018 Lab:  HEPATITIS B, QT, PCR   
  
 03/20/2018 Lab:  HEPATITIS BE AG   
  
 03/20/2018 Lab:  METABOLIC PANEL, BASIC   
  
 03/20/2018 Lab:  PROTHROMBIN TIME + INR   
  
 03/20/2018 Imaging:  [facility]! Dear Telma Escobedo: Thank you for requesting a Good[employer] account. Our records indicate that you already have an active GoodBelly account. You can access your account anytime at [email or web address] Did you know that you can access your hospital and ER discharge instructions at any time in GoodBelly? You can also review all of your test results from your hospital stay or ER visit. Additional Information If you have questions, please visit the Frequently Asked Questions section of the GoodBelly website at [email or web address] Remember, GoodBelly is NOT to be used for urgent needs. For medical emergencies, dial 911. Now available from your iPhone and Android! Please provide this summary of care documentation to your next provider. Your primary care clinician is listed as Merriam Simmonds.  If you have any questions after today's visit, please call [phone].

## 2018-03-20 NOTE — PROGRESS NOTES
[address] [place], [place], [facility] Olaf Leobardo, [place]       Claudean Buoy, NP Elois Poisson, PA-C Corey Rued, [facility]   XAVI Mares NP        at [address]     [address] [place] [address], [facility] Ovi Prakash  22.     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 The active problem list, all pertinent past medical history, medications, liver histology, endoscopic studies, radiologic findings and laboratory findings related to the liver disorder were reviewed with the patient. Most recent was performed with ultrasound in 09/2017. Echogenic and slightly coarsened echotexture to the liver which can reflect  steatosis and/or chronic liver disease/cirrhosis. Poorly defined area of decreased echogenicity adjacent to gallbladder fossa, most suggestive of focal sparing. Shear wave elastography was performed with the ultrasound. This suggests portal fibrosis with a Metavir score of F1. The most recent laboratory studies indicate that the liver transaminases are normal, alkaline phosphatase is normal, tests of hepatic synthetic and metabolic function are normal, and the platelet count is normal.      HBV is being treated with Viread. HBV DNA in 03/2018 was undetectable. The patient has no complaints which can be attributed to liver disease. The patient completes all daily activities without any functional limitations.  The patient has not experienced fatigue, fevers, chills, shortness of breath, chest pain, pain in the right side over the liver, diffuse abdominal pain, nausea, vomiting, constipation, diarrhrea, dry eyes, dry mouth, arthralgias, myalgias, yellowing of the eyes or skin, itching, dark urine, problems concentrating, swelling of the abdomen, swelling of the lower extremities, hematemesis, or hematochezia. Patient Active Problem List    Diagnosis Date Noted    Chronic hepatitis B. Treated with Pegasys from 1/30/04 - 07/15/04.  Hypothyroidism     HBV. Participated in a clinical trial utilizing emtricitabine and Clevudine, relapse after treatment.  HBV.  started Epivir 08/05 with only a 2 log drop in HBV DNA as of 03/06. Adefovir substituted 06/06     Hypertension     Glaucoma     Hypercholesteremia     Gout      Current Outpatient Prescriptions   Medication Sig Dispense Refill    losartan-hydroCHLOROthiazide (HYZAAR) 100-12.5 mg per tablet       amLODIPine (NORVASC) 5 mg tablet       metroNIDAZOLE 1 % glwp       tenofovir DISOPROXIL FUMARATE (VIREAD) 300 mg tablet Take 1 Tab by mouth daily. 90 Tab 3    LORATADINE (CLARITIN PO) Take  by mouth.  TRAZODONE HCL (TRAZODONE PO) Take  by mouth.  NAPROXEN SODIUM (ALEVE PO) Take  by mouth.  LEVOTHYROXINE SODIUM (LEVOTHROID PO) Take  by mouth.  NIACIN (NIASPAN EXTENDED-RELEASE PO) Take  by mouth.  OMEGA-3 FATTY ACIDS (FISH OIL CONCENTRATE PO) Take  by mouth.  ALLOPURINOL PO Take  by mouth.  multivitamin capsule Take 1 Cap by mouth daily. Allergies   Allergen Reactions    Fish Derived Nausea and Vomiting      SYSTEM REVIEW NOT RELATED TO LIVER DISEASE OR REVIEWED ABOVE:  Constitution systems: Negative for fever, chills, weight gain, weight loss. Eyes: Negative for visual changes. ENT: Negative for sore throat, painful swallowing. Respiratory: Negative for cough, hemoptysis, SOB. Cardiology: Negative for chest pain, palpitations. GI:  Negative for constipation or diarrhea. : Negative for urinary frequency, dysuria, hematuria, nocturia. Skin: Negative for rash.   Hematology: Negative for easy bruising, blood clots. Musculo-skelatal: Negative for back pain, muscle pain, weakness. Neurologic: Negative for headaches, dizziness, vertigo, memory problems not related to HE. Psychology: Negative for anxiety, depression. Family/Social History:  He does not drink alcohol and has history of intranasal drug use. He was possibly exposed to hepatitis B sometime in the 1970s. His family is unremarkable for liver disease. He is single with no children. He is a retired . Retired in 2005. Objective:     Visit Vitals    /75 (BP 1 Location: Left arm, BP Patient Position: Sitting)    Pulse 87    Resp 26    Ht 5' 8\" (1.727 m)    Wt 186 lb (84.4 kg)    BMI 28.28 kg/m2       General appearance: no distress  Eyes: sclera anicteric  ENT: no oral lesions, thyroid normal  Nodes: no adenopathy  Skin: no spider angiomata, jaundice, palmar erythema or xanthalasma  Respiratory: clear to auscultation bilaterally  Cardiovascular: regular heart rate, no murmurs, no JVD, no edema  Abdomen: soft, non-tender, liver size normal to percussion/palpation. Spleen not palpable.  No obvious ascites  Extremities: no muscle wasting, no gross arthritic changes  : not examined  Neurologic: alert and oriented, cranial nerves grossly intact, no asterixis    LABORATORY STUDIES:  Liver [place] of [address] Units 3/20/2018 9/20/2017   WBC 4.6 - 13.2 K/uL 3.6 (L) 4.0 (L)   ANC 1.8 - 8.0 K/UL 1.8 2.3   HGB 13.0 - 16.0 g/dL 14.6 13.6    - 420 K/uL 146 148   INR 0.8 - 1.2   0.9    AST 15 - 37 U/L 16 22   ALT 16 - 61 U/L 38 36   Alk Phos 45 - 117 U/L 76 87   Bili, Total 0.2 - 1.0 MG/DL 0.4 0.5   Bili, Direct 0.0 - 0.2 MG/DL 0.1 0.2   Albumin 3.4 - 5.0 g/dL 3.8 3.9   BUN 7.0 - 18 MG/DL 24 (H) 23 (H)   Creat 0.6 - 1.3 MG/DL 0.91 0.80   Na 136 - 145 mmol/L 144 139   K 3.5 - 5.5 mmol/L 4.1 3.4 (L)   Cl 100 - 108 mmol/L 106 104   CO2 21 - 32 mmol/L 28 30   Glucose 74 - 99 mg/dL 93 97     Serologies and Other Pertinent Laboratories:  09/10:  HB e antigen negative, HB e antibody positive, HB surface antigen positive, HB surface antibody negative. Liver Biopsy:  2000:  Knodell score 13 (6584). 2002:  Bridging fibrosis with characteristics suggestive of WADSWORTH.   09/2017. TRANSIENT HEPATIC ELASTOGRAPHY: E Range: 6.26-10.63 kPa, E Mean: 7.92 kPa, E Median: 7.45 kPa, E Std: 1.31 kPa    Endoscopic Procedures:  None available. Radiology:  01/10:  Abdominal ultrasound - liver is diffusely echogenic consistent with fatty infiltration. No focal hepatic abnormalities. 01/12:  Abdominal ultrasound - liver is diffusely echogenic consistent with fatty infiltration. No focal hepatic abnormalities. 01/13:  Abdominal ultrasound - liver is diffusely echogenic consistent with fatty infiltration. No focal hepatic abnormalities. 01/2014: Abdominal ultrasound - liver is diffusely echogenic consistent with fatty infiltration. No focal hepatic abnormalities. 01/2015:  Abdominal ultrasound - liver is diffusely echogenic consistent with fatty infiltration. No focal hepatic abnormalities. 10/2015: Abdominal ultrasound - liver is diffusely echogenic consistent with fatty infiltration. No focal hepatic abnormalities. 3/2016:  Abdominal ultrasound. Moderately increased hepatic echogenicity likely related to steatosis and/or underlying chronic liver disease. No evidence of a liver mass. 9/2016:  Ultrasound of the abdomen. There is increased echogenicity of the liver, similar to prior study. There is a new hypoechoic area within the right lobe the liver near the gallbladder fossa measuring 1.3 x 1.3 x 0.5 cm. Normal direction of blood flow in the portal vein. The liver measures 18.5 cm in length. 10/2016:  MRI of the liver. Hepatic steatosis. Small area of focal fatty sparing adjacent to gallbladder  fossa, very likely accounts for the finding seen on ultrasound.  2. 3 tiny hepatic lesions are too small to definitively characterize, though have features suggestive of benign hemangiomas. 04/2017. Ultrasound of the liver. Diffuse hepatic echogenicity suggesting either hepatic steatosis or other chronic hepatocellular disease. 09/2017. Ultrasound of the liver, performed with the elastography. Echogenic and slightly coarsened echotexture to the liver which can reflect steatosis and/or chronic liver disease/cirrhosis. Poorly defined area of decreased echogenicity adjacent to gallbladder fossa, most suggestive of focal sparing. Other Testing:  None available. 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 The most recent laboratory studies indicate that the liver transaminases are normal, alkaline phosphatase is normal, tests of hepatic synthetic and metabolic function are normal, and the platelet count is normal.  Will perform laboratory testing to monitor liver function and degree of liver injury. This will include hepatic panel, a CBC w/ diff, a BMP, an AFP-L3%, and an HBV DNA.      HBV. HBV is being treated with Viread. HBV DNA in 03/2018 was undetectable. [facility] [address] screening. I explained that he should [address] screening with ultrasound and AFP-L3%  every 6 months henceforth. Repeat imaging was ordered today. Alcohol counseling provided. The need for vaccination against viral hepatitis A will be assessed with serologic and instituted as appropriate. All of the above issues were discussed with the patient. All questions were answered. The patient expressed a clear understanding of the above. Follow-up at the [facility] 32 in 6 months.     Debbi Ratliff NP   Liver [place] of [address] [place] [address], [address]   [address] [facility], [address] [facility]   [phone]

## 2018-03-20 NOTE — PROGRESS NOTES
1. Have you been to the ER, urgent care clinic since your last visit? Hospitalized since your last visit? No    2. Have you seen or consulted any other health care providers outside of the [address] [place] [address] since your last visit? Include any pap smears or colon screening.  no

## 2018-03-21 LAB
HBV DNA SERPL NAA+PROBE-ACNC: NORMAL IU/ML
HBV DNA SERPL NAA+PROBE-LOG IU: NORMAL LOG10IU/ML
TEST INFORMATION: NORMAL

## 2018-03-22 LAB — HBV E AG SERPL QL IA: NEGATIVE

## 2018-03-23 LAB
AFP L3 MFR SERPL: NORMAL % (ref 0–9.9)
AFP SERPL-MCNC: 2.5 NG/ML (ref 0–8)

## 2018-04-05 ENCOUNTER — HOSPITAL ENCOUNTER (OUTPATIENT)
Dept: ULTRASOUND IMAGING | Age: 68
Discharge: HOME | End: 2018-04-05
Payer: MEDICARE

## 2018-04-05 DIAGNOSIS — B19.10 HEP B W/O COMA: ICD-10-CM

## 2018-04-05 PROCEDURE — 76705 ECHO EXAM OF ABDOMEN: CPT

## 2018-04-11 NOTE — PROGRESS NOTES
Please let the patient know that the ultrasound is unchanged and there is nothing suspicious. Thank you.

## 2018-08-16 RX ORDER — TENOFOVIR DISOPROXIL FUMARATE 300 MG/1
300 TABLET, FILM COATED ORAL DAILY
Qty: 90 TAB | Refills: 3 | Status: SHIPPED | OUTPATIENT
Start: 2018-08-16 | End: 2018-09-20 | Stop reason: SDUPTHER

## 2018-08-16 NOTE — TELEPHONE ENCOUNTER
Patient called to follow up on the refill of viread. Informed patient that I will send a message to XAVI Hansen. Patient confirmed understanding.

## 2018-09-20 ENCOUNTER — HOSPITAL ENCOUNTER (OUTPATIENT)
Dept: LAB | Age: 68
Discharge: HOME | End: 2018-09-20
Payer: MEDICARE

## 2018-09-20 ENCOUNTER — OFFICE VISIT (OUTPATIENT)
Dept: HEMATOLOGY | Age: 68
End: 2018-09-20

## 2018-09-20 VITALS
HEIGHT: 68 IN | WEIGHT: 179.2 LBS | TEMPERATURE: 96.4 F | HEART RATE: 78 BPM | RESPIRATION RATE: 18 BRPM | DIASTOLIC BLOOD PRESSURE: 82 MMHG | OXYGEN SATURATION: 98 % | BODY MASS INDEX: 27.16 KG/M2 | SYSTOLIC BLOOD PRESSURE: 138 MMHG

## 2018-09-20 DIAGNOSIS — B19.10 HEP B W/O COMA: ICD-10-CM

## 2018-09-20 DIAGNOSIS — B19.10 HEP B W/O COMA: Primary | ICD-10-CM

## 2018-09-20 LAB
ALBUMIN SERPL-MCNC: 3.9 G/DL (ref 3.4–5)
ALBUMIN/GLOB SERPL: 1.4 {RATIO} (ref 0.8–1.7)
ALP SERPL-CCNC: 89 U/L (ref 45–117)
ALT SERPL-CCNC: 32 U/L (ref 16–61)
ANION GAP SERPL CALC-SCNC: 9 MMOL/L (ref 3–18)
AST SERPL-CCNC: 18 U/L (ref 15–37)
BASOPHILS # BLD: 0 K/UL (ref 0–0.1)
BASOPHILS NFR BLD: 0 % (ref 0–2)
BILIRUB DIRECT SERPL-MCNC: 0.1 MG/DL (ref 0–0.2)
BILIRUB SERPL-MCNC: 0.4 MG/DL (ref 0.2–1)
BUN SERPL-MCNC: 18 MG/DL (ref 7–18)
BUN/CREAT SERPL: 20 (ref 12–20)
CALCIUM SERPL-MCNC: 8.7 MG/DL (ref 8.5–10.1)
CHLORIDE SERPL-SCNC: 105 MMOL/L (ref 100–108)
CO2 SERPL-SCNC: 29 MMOL/L (ref 21–32)
CREAT SERPL-MCNC: 0.89 MG/DL (ref 0.6–1.3)
DIFFERENTIAL METHOD BLD: ABNORMAL
EOSINOPHIL # BLD: 0.3 K/UL (ref 0–0.4)
EOSINOPHIL NFR BLD: 10 % (ref 0–5)
ERYTHROCYTE [DISTWIDTH] IN BLOOD BY AUTOMATED COUNT: 13.2 % (ref 11.6–14.5)
GLOBULIN SER CALC-MCNC: 2.7 G/DL (ref 2–4)
GLUCOSE SERPL-MCNC: 95 MG/DL (ref 74–99)
HCT VFR BLD AUTO: 42.3 % (ref 36–48)
HGB BLD-MCNC: 14.3 G/DL (ref 13–16)
LYMPHOCYTES # BLD: 0.9 K/UL (ref 0.9–3.6)
LYMPHOCYTES NFR BLD: 27 % (ref 21–52)
MCH RBC QN AUTO: 32.8 PG (ref 24–34)
MCHC RBC AUTO-ENTMCNC: 33.8 G/DL (ref 31–37)
MCV RBC AUTO: 97 FL (ref 74–97)
MONOCYTES # BLD: 0.4 K/UL (ref 0.05–1.2)
MONOCYTES NFR BLD: 11 % (ref 3–10)
NEUTS SEG # BLD: 1.8 K/UL (ref 1.8–8)
NEUTS SEG NFR BLD: 52 % (ref 40–73)
PLATELET # BLD AUTO: 127 K/UL (ref 135–420)
PMV BLD AUTO: 8.4 FL (ref 9.2–11.8)
POTASSIUM SERPL-SCNC: 4.2 MMOL/L (ref 3.5–5.5)
PROT SERPL-MCNC: 6.6 G/DL (ref 6.4–8.2)
RBC # BLD AUTO: 4.36 M/UL (ref 4.7–5.5)
SODIUM SERPL-SCNC: 143 MMOL/L (ref 136–145)
WBC # BLD AUTO: 3.4 K/UL (ref 4.6–13.2)

## 2018-09-20 PROCEDURE — 85025 COMPLETE CBC W/AUTO DIFF WBC: CPT | Performed by: NURSE PRACTITIONER

## 2018-09-20 PROCEDURE — 80048 BASIC METABOLIC PNL TOTAL CA: CPT | Performed by: NURSE PRACTITIONER

## 2018-09-20 PROCEDURE — 80076 HEPATIC FUNCTION PANEL: CPT | Performed by: NURSE PRACTITIONER

## 2018-09-20 PROCEDURE — 36415 COLL VENOUS BLD VENIPUNCTURE: CPT | Performed by: NURSE PRACTITIONER

## 2018-09-20 PROCEDURE — 87517 HEPATITIS B DNA QUANT: CPT | Performed by: NURSE PRACTITIONER

## 2018-09-20 PROCEDURE — 82107 ALPHA-FETOPROTEIN L3: CPT | Performed by: NURSE PRACTITIONER

## 2018-09-20 RX ORDER — TENOFOVIR DISOPROXIL FUMARATE 300 MG/1
300 TABLET, FILM COATED ORAL DAILY
Qty: 90 TAB | Refills: 3 | Status: SHIPPED | OUTPATIENT
Start: 2018-09-20 | End: 2019-09-19 | Stop reason: SDUPTHER

## 2018-09-20 NOTE — PROGRESS NOTES
[address], [address], [place], [place]       XAVI Davis PA-C Suan Goes, [facility]   XAVI Spangler NP        at [facility]     [address] [place] [facility], [address] [address]     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 The active problem list, all pertinent past medical history, medications, liver histology, endoscopic studies, radiologic findings and laboratory findings related to the liver disorder were reviewed with the patient. Most recent was performed with ultrasound in 04/2018. Echogenic and slightly coarsened echotexture to the liver which can reflect  steatosis and/or chronic liver disease/cirrhosis. Poorly defined area of decreased echogenicity adjacent to gallbladder fossa, most suggestive of focal sparing. Lori Parent has been previously seen. Shear wave elastography was performed with ultrasound in 09/2017. This suggests portal fibrosis with a Metavir score of F1. The most recent laboratory studies indicate that the liver transaminases are normal, alkaline phosphatase is normal, tests of hepatic synthetic and metabolic function are normal, and the platelet count is depressed. HBV is being treated with Viread. HBV DNA in 09/2018 was undetectable. The patient has no complaints which can be attributed to liver disease. The patient completes all daily activities without any functional limitations.  The patient has not experienced fatigue, fevers, chills, shortness of breath, chest pain, pain in the right side over the liver, diffuse abdominal pain, nausea, vomiting, constipation, diarrhrea, dry eyes, dry mouth, arthralgias, myalgias, yellowing of the eyes or skin, itching, dark urine, problems concentrating, swelling of the abdomen, swelling of the lower extremities, hematemesis, or hematochezia. Patient Active Problem List    Diagnosis Date Noted    Chronic hepatitis B. Treated with Pegasys from 1/30/04 - 07/15/04.  Hypothyroidism     HBV. Participated in a clinical trial utilizing emtricitabine and Clevudine, relapse after treatment.  HBV.  started Epivir 08/05 with only a 2 log drop in HBV DNA as of 03/06. Adefovir substituted 06/06     Hypertension     Glaucoma     Hypercholesteremia     Gout      Current Outpatient Prescriptions   Medication Sig Dispense Refill    tenofovir DISOPROXIL FUMARATE (VIREAD) 300 mg tablet Take 1 Tab by mouth daily. 90 Tab 3    losartan-hydroCHLOROthiazide (HYZAAR) 100-12.5 mg per tablet       amLODIPine (NORVASC) 5 mg tablet       metroNIDAZOLE 1 % glwp       LORATADINE (CLARITIN PO) Take  by mouth.  NAPROXEN SODIUM (ALEVE PO) Take  by mouth.  LEVOTHYROXINE SODIUM (LEVOTHROID PO) Take  by mouth.  NIACIN (NIASPAN EXTENDED-RELEASE PO) Take  by mouth.  OMEGA-3 FATTY ACIDS (FISH OIL CONCENTRATE PO) Take  by mouth.  ALLOPURINOL PO Take  by mouth.  multivitamin capsule Take 1 Cap by mouth daily. No Active Allergies   SYSTEM REVIEW NOT RELATED TO LIVER DISEASE OR REVIEWED ABOVE:  Constitution systems: Negative for fever, chills, weight gain, weight loss. Eyes: Negative for visual changes. ENT: Negative for sore throat, painful swallowing. Respiratory: Negative for cough, hemoptysis, SOB. Cardiology: Negative for chest pain, palpitations. GI:  Negative for constipation or diarrhea. : Negative for urinary frequency, dysuria, hematuria, nocturia. Skin: Negative for rash. Hematology: Negative for easy bruising, blood clots.     Musculo-skelatal: Negative for back pain, muscle pain, weakness. Neurologic: Negative for headaches, dizziness, vertigo, memory problems not related to HE. Psychology: Negative for anxiety, depression. Family/Social History:  He does not drink alcohol and has history of intranasal drug use. He was possibly exposed to hepatitis B sometime in the 1970s. His family is unremarkable for liver disease. He is single with no children. He is a retired . Retired in 2005. Objective:     Visit Vitals    /82 (BP 1 Location: Left arm, BP Patient Position: Sitting)    Pulse 78    Temp 96.4 °F (35.8 °C) (Tympanic)    Resp 18    Ht 5' 8\" (1.727 m)    Wt 179 lb 3.2 oz (81.3 kg)    SpO2 98%    BMI 27.25 kg/m2       General appearance: no distress  Eyes: sclera anicteric  ENT: no oral lesions, thyroid normal  Nodes: no adenopathy  Skin: no spider angiomata, jaundice, palmar erythema or xanthalasma  Respiratory: clear to auscultation bilaterally  Cardiovascular: regular heart rate, no murmurs, no JVD, no edema  Abdomen: soft, non-tender, liver size normal to percussion/palpation. Spleen not palpable.  No obvious ascites  Extremities: no muscle wasting, no gross arthritic changes  : not examined  Neurologic: alert and oriented, cranial nerves grossly intact, no asterixis    LABORATORY STUDIES:  Liver [place] of [address] [place] [address] 9/20/2018 3/20/2018   WBC 4.6 - 13.2 K/uL 3.4 (L) 3.6 (L)   ANC 1.8 - 8.0 K/UL 1.8 1.8   HGB 13.0 - 16.0 g/dL 14.3 14.6    - 420 K/uL 127 (L) 146   INR 0.8 - 1.2    0.9   AST 15 - 37 U/L 18 16   ALT 16 - 61 U/L 32 38   Alk Phos 45 - 117 U/L 89 76   Bili, Total 0.2 - 1.0 MG/DL 0.4 0.4   Bili, Direct 0.0 - 0.2 MG/DL 0.1 0.1   Albumin 3.4 - 5.0 g/dL 3.9 3.8   BUN 7.0 - 18 MG/DL 18 24 (H)   Creat 0.6 - 1.3 MG/DL 0.89 0.91   Na 136 - 145 mmol/L 143 144   K 3.5 - 5.5 mmol/L 4.2 4.1   Cl 100 - 108 mmol/L 105 106   CO2 21 - 32 mmol/L 29 28   Glucose 74 - 99 mg/dL 95 93     Liver [place] of [place] Latest Ref Rng & Units 9/20/2017 3/20/2017   WBC 4.6 - 13.2 K/uL 4.0 (L) 4.5 (L)   ANC 1.8 - 8.0 K/UL 2.3 2.7   HGB 13.0 - 16.0 g/dL 13.6 14.4    - 420 K/uL 148 137   INR 0.8 - 1.2       AST 15 - 37 U/L 22 23   ALT 16 - 61 U/L 36 45   Alk Phos 45 - 117 U/L 87 90   Bili, Total 0.2 - 1.0 MG/DL 0.5 0.5   Bili, Direct 0.0 - 0.2 MG/DL 0.2 0.2   Albumin 3.4 - 5.0 g/dL 3.9 4.1   BUN 7.0 - 18 MG/DL 23 (H) 19 (H)   Creat 0.6 - 1.3 MG/DL 0.80 0.70   Na 136 - 145 mmol/L 139 145   K 3.5 - 5.5 mmol/L 3.4 (L) 4.2   Cl 100 - 108 mmol/L 104 106   CO2 21 - 32 mmol/L 30 30   Glucose 74 - 99 mg/dL 97 89     Serologies and Other Pertinent Laboratories:  09/10:  HB e antigen negative, HB e antibody positive, HB surface antigen positive, HB surface antibody negative. Liver Biopsy:  2000:  Knodell score 13 (1015). 2002:  Bridging fibrosis with characteristics suggestive of WADSWORTH.   09/2017. TRANSIENT HEPATIC ELASTOGRAPHY: E Range: 6.26-10.63 kPa, E Mean: 7.92 kPa, E Median: 7.45 kPa, E Std: 1.31 kPa    Endoscopic Procedures:  None available. Radiology:  01/10:  Abdominal ultrasound - liver is diffusely echogenic consistent with fatty infiltration. No focal hepatic abnormalities. 01/12:  Abdominal ultrasound - liver is diffusely echogenic consistent with fatty infiltration. No focal hepatic abnormalities. 01/13:  Abdominal ultrasound - liver is diffusely echogenic consistent with fatty infiltration. No focal hepatic abnormalities. 01/2014: Abdominal ultrasound - liver is diffusely echogenic consistent with fatty infiltration. No focal hepatic abnormalities. 01/2015:  Abdominal ultrasound - liver is diffusely echogenic consistent with fatty infiltration. No focal hepatic abnormalities. 10/2015: Abdominal ultrasound - liver is diffusely echogenic consistent with fatty infiltration. No focal hepatic abnormalities. 3/2016:  Abdominal ultrasound.  Moderately increased hepatic echogenicity likely related to steatosis and/or underlying chronic liver disease. No evidence of a liver mass. 9/2016:  Ultrasound of the abdomen. There is increased echogenicity of the liver, similar to prior study. There is a new hypoechoic area within the right lobe the liver near the gallbladder fossa measuring 1.3 x 1.3 x 0.5 cm. Normal direction of blood flow in the portal vein. The liver measures 18.5 cm in length. 10/2016:  MRI of the liver. Hepatic steatosis. Small area of focal fatty sparing adjacent to gallbladder  fossa, very likely accounts for the finding seen on ultrasound. 2. 3 tiny hepatic lesions are too small to definitively characterize, though have features suggestive of benign hemangiomas. 04/2017. Ultrasound of the liver. Diffuse hepatic echogenicity suggesting either hepatic steatosis or other chronic hepatocellular disease. 09/2017. Ultrasound of the liver, performed with the elastography. Echogenic and slightly coarsened echotexture to the liver which can reflect steatosis and/or chronic liver disease/cirrhosis. Poorly defined area of decreased echogenicity adjacent to gallbladder fossa, most suggestive of focal sparing. 05/2018. Ultrasound of the liver. Echogenic liver parenchyma may be due to steatosis or underlying hepatocellular disease. An area of decreased echotexture adjacent to the gallbladder likely represents a region of fatty sparing similar to the prior study. Other Testing:  None available. 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 The most recent laboratory studies indicate that the liver transaminases are normal, alkaline phosphatase is normal, tests of hepatic synthetic and metabolic function are normal, and the platelet count is depressed. HBV. HBV is being treated with Viread. HBV DNA in 09/2018 was undetectable. [facility] [address] screening.   I explained that he should [address] screening with ultrasound and AFP-L3%  every 6 months henceforth. Repeat imaging was ordered today. Shear wave elastography will also be performed with the ultrasound. Elastography  can assess liver fibrosis and determine if a patient has advanced fibrosis or cirrhosis without the need for liver biopsy. Alcohol counseling provided. The need for vaccination against viral hepatitis A will be assessed with serologic and instituted as appropriate. All of the above issues were discussed with the patient. All questions were answered. The patient expressed a clear understanding of the above. Follow-up at the [place] [address] in 6 months.     Carol Angeles NP   Liver [place] of [address] [place] [address], [address] [place] [address]   [phone]

## 2018-09-20 NOTE — MR AVS SNAPSHOT
[place] [address] 
[phone] Patient: Amelia Solares Sr. MRN: [id number] VSU:9[birth year]16/5909 Visit Information Date & Time Provider Department Dept. Phone Encounter #  
 9/20/2018 10:30 AM XAVI Hanna [address] of  [address] [id number] Follow-up Instructions Return in about 6 months (around 3/20/2019). Upcoming Health Maintenance Date Due Hepatitis C Screening 1950 DTaP/Tdap/Td series (1 - Tdap) 1/20/1973 FOBT Q 1 YEAR AGE 50-75 1/20/2002 ZOSTER VACCINE AGE 60> 11/20/2011 GLAUCOMA SCREENING Q2Y 1/20/2017 Pneumococcal 65+ Low/Medium Risk (1 of 2 - PCV13) 1/20/2017 MEDICARE YEARLY EXAM 3/14/2018 Influenza Age 5 to Adult 8/1/2018 Allergies as of 9/20/2018  Review Complete On: 9/20/2018 By: Felipe Dimas No Active Allergies Current Immunizations  Never Reviewed No immunizations on file. Not reviewed this visit You Were Diagnosed With   
  
 Codes Comments Hep B w/o coma    -  Primary ICD-10-CM: B19.10 ICD-9-CM: 070.30 Vitals BP Pulse Temp Resp Height(growth percentile) 138/82 (BP 1 Location: Left arm, BP Patient Position: Sitting) 78 96.4 °F (35.8 °C) (Tympanic) 18 5' 8\" (1.727 m) Weight(growth percentile) SpO2 BMI Smoking Status 179 lb 3.2 oz (81.3 kg) 98% 27.25 kg/m2 Never Smoker Vitals History BMI and BSA Data Body Mass Index Body Surface Area  
 27.25 kg/m 2 1.97 m 2 Preferred Pharmacy Pharmacy Name Phone [facility] # [phone] [facility] [phone] Your Updated Medication List  
  
   
This list is accurate as of 9/20/18 10:43 AM.  Always use your most recent med list.  
  
  
  
  
 Davonna Amboy Take  by mouth. ALLOPURINOL PO Take  by mouth. amLODIPine 5 mg tablet Commonly known as:  Pack Fanti CLARITIN PO Take  by mouth. FISH OIL CONCENTRATE PO Take  by mouth. LEVOTHROID PO Take  by mouth.  
  
 losartan-hydroCHLOROthiazide 100-12.5 mg per tablet Commonly known as:  HYZAAR  
  
 metroNIDAZOLE 1 % Glwp  
  
 multivitamin capsule Take 1 Cap by mouth daily. NIASPAN EXTENDED-RELEASE PO Take  by mouth. tenofovir DISOPROXIL FUMARATE 300 mg tablet Commonly known as:  Puente Dynes Take 1 Tab by mouth daily. Prescriptions Sent to Pharmacy Refills  
 tenofovir DISOPROXIL FUMARATE (VIREAD) 300 mg tablet 3 Sig: Take 1 Tab by mouth daily. Class: Normal  
 Pharmacy: [facility]ON[facility] SOLAN[facility] # [address], [address] [place] [address] Street  #: [phone] Route: Oral  
  
Follow-up Instructions Return in about 6 months (around 3/20/2019). To-Do List   
 09/20/2018 Lab:  AFP WITH AFP-L3%   
  
 09/20/2018 Lab:  CBC WITH AUTOMATED DIFF   
  
 09/20/2018 Lab:  HEPATIC FUNCTION PANEL   
  
 09/20/2018 Lab:  HEPATITIS B, QT, PCR   
  
 09/20/2018 Lab:  METABOLIC PANEL, BASIC   
  
 09/20/2018 Imaging:  US ABD COMP W ELASTOGRAPHY Introducing [facility]! Dear Maryjo Loge: Thank you for requesting a [employer] account. Our records indicate that you already have an active [employer] account. You can access your account anytime at [email or web address] Did you know that you can access your hospital and ER discharge instructions at any time in [employer]? You can also review all of your test results from your hospital stay or ER visit. Additional Information If you have questions, please visit the Frequently Asked Questions section of the [employer] website at [email or web address] Remember, [employer] is NOT to be used for urgent needs. For medical emergencies, dial 911. Now available from your iPhone and Android! Please provide this summary of care documentation to your next provider. Your primary care clinician is listed as Cleven Majors. If you have any questions after today's visit, please call [phone].

## 2018-09-20 NOTE — PROGRESS NOTES
1. Have you been to the ER, urgent care clinic since your last visit? Hospitalized since your last visit? No    2. Have you seen or consulted any other health care providers outside of the [facility] since your last visit? Include any pap smears or colon screening. Yes When: March 2018 Pcp visit.

## 2018-09-21 LAB
AFP L3 MFR SERPL: NORMAL % (ref 0–9.9)
AFP SERPL-MCNC: 2.1 NG/ML (ref 0–8)
HBV DNA SERPL NAA+PROBE-ACNC: NORMAL IU/ML
HBV DNA SERPL NAA+PROBE-LOG IU: NORMAL LOG10 IU/ML
TEST INFORMATION: NORMAL

## 2018-10-04 ENCOUNTER — APPOINTMENT (OUTPATIENT)
Dept: ULTRASOUND IMAGING | Age: 68
End: 2018-10-04

## 2018-10-04 ENCOUNTER — HOSPITAL ENCOUNTER (OUTPATIENT)
Dept: ULTRASOUND IMAGING | Age: 68
Discharge: HOME | End: 2018-10-04

## 2018-10-04 DIAGNOSIS — B19.10 HEP B W/O COMA: ICD-10-CM

## 2018-10-19 ENCOUNTER — HOSPITAL ENCOUNTER (OUTPATIENT)
Dept: ULTRASOUND IMAGING | Age: 68
Discharge: HOME | End: 2018-10-19
Payer: MEDICARE

## 2018-10-19 PROCEDURE — 0346T US ABD COMP W ELASTOGRAPHY: CPT

## 2018-10-21 NOTE — PROGRESS NOTES
Please let him know that his ultrasound is unremarkable. No hepatic masses. Elastography suggests a fibrosis score of F1, normal to mild hepatic fibrosis. Thank you.

## 2019-03-19 ENCOUNTER — OFFICE VISIT (OUTPATIENT)
Dept: HEMATOLOGY | Age: 69
End: 2019-03-19

## 2019-03-19 ENCOUNTER — HOSPITAL ENCOUNTER (OUTPATIENT)
Dept: LAB | Age: 69
Discharge: HOME | End: 2019-03-19
Payer: MEDICARE

## 2019-03-19 VITALS
RESPIRATION RATE: 19 BRPM | SYSTOLIC BLOOD PRESSURE: 117 MMHG | WEIGHT: 177.2 LBS | BODY MASS INDEX: 26.86 KG/M2 | HEIGHT: 68 IN | TEMPERATURE: 97 F | OXYGEN SATURATION: 98 % | DIASTOLIC BLOOD PRESSURE: 81 MMHG | HEART RATE: 81 BPM

## 2019-03-19 DIAGNOSIS — B19.10 HEP B W/O COMA: ICD-10-CM

## 2019-03-19 DIAGNOSIS — B19.10 HEP B W/O COMA: Primary | ICD-10-CM

## 2019-03-19 LAB
ALBUMIN SERPL-MCNC: 4.1 G/DL (ref 3.4–5)
ALBUMIN/GLOB SERPL: 1.5 {RATIO} (ref 0.8–1.7)
ALP SERPL-CCNC: 106 U/L (ref 45–117)
ALT SERPL-CCNC: 37 U/L (ref 16–61)
ANION GAP SERPL CALC-SCNC: 3 MMOL/L (ref 3–18)
AST SERPL-CCNC: 20 U/L (ref 15–37)
BASOPHILS # BLD: 0 K/UL (ref 0–0.1)
BASOPHILS NFR BLD: 0 % (ref 0–2)
BILIRUB DIRECT SERPL-MCNC: 0.3 MG/DL (ref 0–0.2)
BILIRUB SERPL-MCNC: 1 MG/DL (ref 0.2–1)
BUN SERPL-MCNC: 24 MG/DL (ref 7–18)
BUN/CREAT SERPL: 26 (ref 12–20)
CALCIUM SERPL-MCNC: 9.4 MG/DL (ref 8.5–10.1)
CHLORIDE SERPL-SCNC: 105 MMOL/L (ref 100–108)
CO2 SERPL-SCNC: 32 MMOL/L (ref 21–32)
CREAT SERPL-MCNC: 0.92 MG/DL (ref 0.6–1.3)
DIFFERENTIAL METHOD BLD: ABNORMAL
EOSINOPHIL # BLD: 0.3 K/UL (ref 0–0.4)
EOSINOPHIL NFR BLD: 6 % (ref 0–5)
ERYTHROCYTE [DISTWIDTH] IN BLOOD BY AUTOMATED COUNT: 13.1 % (ref 11.6–14.5)
GLOBULIN SER CALC-MCNC: 2.8 G/DL (ref 2–4)
GLUCOSE SERPL-MCNC: 94 MG/DL (ref 74–99)
HCT VFR BLD AUTO: 45 % (ref 36–48)
HGB BLD-MCNC: 15.1 G/DL (ref 13–16)
LYMPHOCYTES # BLD: 1 K/UL (ref 0.9–3.6)
LYMPHOCYTES NFR BLD: 22 % (ref 21–52)
MCH RBC QN AUTO: 32.9 PG (ref 24–34)
MCHC RBC AUTO-ENTMCNC: 33.6 G/DL (ref 31–37)
MCV RBC AUTO: 98 FL (ref 74–97)
MONOCYTES # BLD: 0.3 K/UL (ref 0.05–1.2)
MONOCYTES NFR BLD: 7 % (ref 3–10)
NEUTS SEG # BLD: 3 K/UL (ref 1.8–8)
NEUTS SEG NFR BLD: 65 % (ref 40–73)
PLATELET # BLD AUTO: 165 K/UL (ref 135–420)
PMV BLD AUTO: 8.6 FL (ref 9.2–11.8)
POTASSIUM SERPL-SCNC: 4.4 MMOL/L (ref 3.5–5.5)
PROT SERPL-MCNC: 6.9 G/DL (ref 6.4–8.2)
RBC # BLD AUTO: 4.59 M/UL (ref 4.7–5.5)
SODIUM SERPL-SCNC: 140 MMOL/L (ref 136–145)
WBC # BLD AUTO: 4.6 K/UL (ref 4.6–13.2)

## 2019-03-19 PROCEDURE — 80048 BASIC METABOLIC PNL TOTAL CA: CPT

## 2019-03-19 PROCEDURE — 87517 HEPATITIS B DNA QUANT: CPT

## 2019-03-19 PROCEDURE — 85025 COMPLETE CBC W/AUTO DIFF WBC: CPT

## 2019-03-19 PROCEDURE — 36415 COLL VENOUS BLD VENIPUNCTURE: CPT

## 2019-03-19 PROCEDURE — 80076 HEPATIC FUNCTION PANEL: CPT

## 2019-03-19 PROCEDURE — 82107 ALPHA-FETOPROTEIN L3: CPT

## 2019-03-19 NOTE — PROGRESS NOTES
1. Have you been to the ER, urgent care clinic since your last visit? Hospitalized since your last visit? No    2. Have you seen or consulted any other health care providers outside of the [address] [place] [address] since your last visit? Include any pap smears or colon screening.  Yes PCP

## 2019-03-19 NOTE — PROGRESS NOTES
[address] MD David, [address], [place], [place]       XAVI Livingston PA-C Bertina Ishikawa, [facility]   XAVI Resendez NP        at [address]     [address] [facility], [address] [address]     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 The active problem list, all pertinent past medical history, medications, liver histology, endoscopic studies, radiologic findings and laboratory findings related to the liver disorder were reviewed with the patient. Most recent was performed with ultrasound in 04/2018. Echogenic and slightly coarsened echotexture to the liver which can reflect  steatosis and/or chronic liver disease/cirrhosis. Poorly defined area of decreased echogenicity adjacent to gallbladder fossa, most suggestive of focal sparing. Mart Blades has been previously seen. Shear wave elastography was performed with ultrasound in 09/2017. This suggests portal fibrosis with a Metavir score of F1. The elastography was repeated in 01/2018 and the suggested Metavir fibrosis score remains F1. The most recent laboratory studies indicate that the liver transaminases are normal, alkaline phosphatase is normal, tests of hepatic synthetic and metabolic function are normal, and the platelet count is normal.        HBV is being treated with Viread. HBV DNA in 09/2018 was undetectable. The patient has no complaints which can be attributed to liver disease. The patient completes all daily activities without any functional limitations.  The patient has not experienced fatigue, fevers, chills, shortness of breath, chest pain, pain in the right side over the liver, diffuse abdominal pain, nausea, vomiting, constipation, diarrhrea, dry eyes, dry mouth, arthralgias, myalgias, yellowing of the eyes or skin, itching, dark urine, problems concentrating, swelling of the abdomen, swelling of the lower extremities, hematemesis, or hematochezia. Patient Active Problem List    Diagnosis Date Noted    Chronic hepatitis B. Treated with Pegasys from 1/30/04 - 07/15/04.  Hypothyroidism     HBV. Participated in a clinical trial utilizing emtricitabine and Clevudine, relapse after treatment.  HBV.  started Epivir 08/05 with only a 2 log drop in HBV DNA as of 03/06. Adefovir substituted 06/06     Hypertension     Glaucoma     Hypercholesteremia     Gout      Current Outpatient Medications   Medication Sig Dispense Refill    tenofovir DISOPROXIL FUMARATE (VIREAD) 300 mg tablet Take 1 Tab by mouth daily. 90 Tab 3    losartan-hydroCHLOROthiazide (HYZAAR) 100-12.5 mg per tablet       amLODIPine (NORVASC) 5 mg tablet       LEVOTHYROXINE SODIUM (LEVOTHROID PO) Take  by mouth.  NIACIN (NIASPAN EXTENDED-RELEASE PO) Take  by mouth.  OMEGA-3 FATTY ACIDS (FISH OIL CONCENTRATE PO) Take  by mouth.  ALLOPURINOL PO Take  by mouth.  multivitamin capsule Take 1 Cap by mouth daily.  metroNIDAZOLE 1 % glwp       LORATADINE (CLARITIN PO) Take  by mouth.  NAPROXEN SODIUM (ALEVE PO) Take  by mouth. No Known Allergies   SYSTEM REVIEW NOT RELATED TO LIVER DISEASE OR REVIEWED ABOVE:  Constitution systems: Negative for fever, chills, weight gain, weight loss. Eyes: Negative for visual changes. ENT: Negative for sore throat, painful swallowing. Respiratory: Negative for cough, hemoptysis, SOB. Cardiology: Negative for chest pain, palpitations. GI:  Negative for constipation or diarrhea. : Negative for urinary frequency, dysuria, hematuria, nocturia.    Skin: Negative for rash.  Hematology: Negative for easy bruising, blood clots. Musculo-skelatal: Negative for back pain, muscle pain, weakness. Neurologic: Negative for headaches, dizziness, vertigo, memory problems not related to HE. Psychology: Negative for anxiety, depression. Family/Social History:  He does not drink alcohol and has history of intranasal drug use. He was possibly exposed to hepatitis B sometime in the 1970s. His family is unremarkable for liver disease. He is single with no children. He is a retired . Retired in 2005. Objective:     Visit Vitals  /81 (BP 1 Location: Right arm, BP Patient Position: Sitting)   Pulse 81   Temp 97 °F (36.1 °C)   Resp 19   Ht 5' 8\" (1.727 m)   Wt 177 lb 3.2 oz (80.4 kg)   SpO2 98%   BMI 26.94 kg/m²       General appearance: no distress  Eyes: sclera anicteric  ENT: no oral lesions, thyroid normal  Nodes: no adenopathy  Skin: no spider angiomata, jaundice, palmar erythema or xanthalasma  Respiratory: clear to auscultation bilaterally  Cardiovascular: regular heart rate, no murmurs, no JVD, no edema  Abdomen: soft, non-tender, liver size normal to percussion/palpation. Spleen not palpable.  No obvious ascites  Extremities: no muscle wasting, no gross arthritic changes  : not examined  Neurologic: alert and oriented, cranial nerves grossly intact, no asterixis    LABORATORY STUDIES:  [facility] [place] of [address] Units 3/19/2019 9/20/2018   WBC 4.6 - 13.2 K/uL 4.6 3.4 (L)   ANC 1.8 - 8.0 K/UL 3.0 1.8   HGB 13.0 - 16.0 g/dL 15.1 14.3    - 420 K/uL 165 127 (L)   INR 0.8 - 1.2       AST 15 - 37 U/L 20 18   ALT 16 - 61 U/L 37 32   Alk Phos 45 - 117 U/L 106 89   Bili, Total 0.2 - 1.0 MG/DL 1.0 0.4   Bili, Direct 0.0 - 0.2 MG/DL 0.3 (H) 0.1   Albumin 3.4 - 5.0 g/dL 4.1 3.9   BUN 7.0 - 18 MG/DL 24 (H) 18   Creat 0.6 - 1.3 MG/DL 0.92 0.89   Na 136 - 145 mmol/L 140 143   K 3.5 - 5.5 mmol/L 4.4 4.2   Cl 100 - 108 mmol/L 105 105   CO2 21 - 32 mmol/L 32 29   Glucose 74 - 99 mg/dL 94 95     Cancer Screening Latest Ref Rng & Units 3/19/2019 9/20/2018 3/20/2018   AFP, Serum 0.0 - 8.0 ng/mL 2.4 2.1 2.5   AFP-L3% 0.0 - 9.9 % Comment Comment Comment     Serologies and Other Pertinent Laboratories:  09/10:  HB e antigen negative, HB e antibody positive, HB surface antigen positive, HB surface antibody negative. Liver Biopsy:  2000:  Knodell score 13 (5990). 2002:  Bridging fibrosis with characteristics suggestive of [place]TH.   09/2017. TRANSIENT HEPATIC ELASTOGRAPHY: E Range: 6.26-10.63 kPa, E Mean: 7.92 kPa, E Median: 7.45 kPa, E Std: 1.31 kPa    Endoscopic Procedures:  None available. Radiology:  01/10:  Abdominal ultrasound - liver is diffusely echogenic consistent with fatty infiltration. No focal hepatic abnormalities. 01/12:  Abdominal ultrasound - liver is diffusely echogenic consistent with fatty infiltration. No focal hepatic abnormalities. 01/13:  Abdominal ultrasound - liver is diffusely echogenic consistent with fatty infiltration. No focal hepatic abnormalities. 01/2014: Abdominal ultrasound - liver is diffusely echogenic consistent with fatty infiltration. No focal hepatic abnormalities. 01/2015:  Abdominal ultrasound - liver is diffusely echogenic consistent with fatty infiltration. No focal hepatic abnormalities. 10/2015: Abdominal ultrasound - liver is diffusely echogenic consistent with fatty infiltration. No focal hepatic abnormalities. 3/2016:  Abdominal ultrasound. Moderately increased hepatic echogenicity likely related to steatosis and/or underlying chronic liver disease. No evidence of a liver mass. 9/2016:  Ultrasound of the abdomen. There is increased echogenicity of the liver, similar to prior study. There is a new hypoechoic area within the right lobe the liver near the gallbladder fossa measuring 1.3 x 1.3 x 0.5 cm. Normal direction of blood flow in the portal vein.  The liver measures 18.5 cm in length. 10/2016:  MRI of the liver. Hepatic steatosis. Small area of focal fatty sparing adjacent to gallbladder  fossa, very likely accounts for the finding seen on ultrasound. 2. 3 tiny hepatic lesions are too small to definitively characterize, though have features suggestive of benign hemangiomas. 04/2017. Ultrasound of the liver. Diffuse hepatic echogenicity suggesting either hepatic steatosis or other chronic hepatocellular disease. 09/2017. Ultrasound of the liver, performed with the elastography. Echogenic and slightly coarsened echotexture to the liver which can reflect steatosis and/or chronic liver disease/cirrhosis. Poorly defined area of decreased echogenicity adjacent to gallbladder fossa, most suggestive of focal sparing. 05/2018. Ultrasound of the liver. Echogenic liver parenchyma may be due to steatosis or underlying hepatocellular disease. An area of decreased echotexture adjacent to the gallbladder likely represents a region of fatty sparing similar to the prior study. 10/2018. Ultrasound of the liver. Echogenic liver parenchyma may be due to steatosis or underlying hepatocellular disease. There are areas of fatty sparing without definite mass. Other Testing:  None available. 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 The most recent laboratory studies indicate that the liver transaminases are normal, alkaline phosphatase is normal, tests of hepatic synthetic and metabolic function are normal, and the platelet count is depressed. HBV. HBV is being treated with Viread. HBV DNA in 09/2018 was undetectable. [address] screening. I explained that he should [address] screening with ultrasound and AFP-L3%  every 6 months henceforth. Repeat imaging was ordered today. Shear wave elastography will also be performed annually.    Elastography  can assess liver fibrosis and determine if a patient has advanced fibrosis or cirrhosis without the need for liver biopsy. Alcohol counseling provided. The need for vaccination against viral hepatitis A will be assessed with serologic and instituted as appropriate. All of the above issues were discussed with the patient. All questions were answered. The patient expressed a clear understanding of the above. Follow-up at the [facility] 32 in 6 months.     Delphine Felty, NP   [facility] [place] of [address] [place] [address], [address] [place] [address] Karen To, [address] [facility]   [phone]

## 2019-03-20 LAB
AFP L3 MFR SERPL: NORMAL % (ref 0–9.9)
AFP SERPL-MCNC: 2.4 NG/ML (ref 0–8)
HBV DNA SERPL NAA+PROBE-ACNC: NORMAL IU/ML
HBV DNA SERPL NAA+PROBE-LOG IU: NORMAL LOG10 IU/ML
TEST INFORMATION: NORMAL

## 2019-04-11 ENCOUNTER — HOSPITAL ENCOUNTER (OUTPATIENT)
Dept: ULTRASOUND IMAGING | Age: 69
Discharge: HOME | End: 2019-04-11
Payer: MEDICARE

## 2019-04-11 DIAGNOSIS — B19.10 HEP B W/O COMA: ICD-10-CM

## 2019-04-11 PROCEDURE — 76705 ECHO EXAM OF ABDOMEN: CPT

## 2019-09-19 ENCOUNTER — OFFICE VISIT (OUTPATIENT)
Dept: HEMATOLOGY | Age: 69
End: 2019-09-19

## 2019-09-19 ENCOUNTER — HOSPITAL ENCOUNTER (OUTPATIENT)
Dept: LAB | Age: 69
Discharge: HOME | End: 2019-09-19
Payer: MEDICARE

## 2019-09-19 VITALS
WEIGHT: 161 LBS | BODY MASS INDEX: 24.4 KG/M2 | HEIGHT: 68 IN | TEMPERATURE: 98.7 F | HEART RATE: 75 BPM | DIASTOLIC BLOOD PRESSURE: 71 MMHG | SYSTOLIC BLOOD PRESSURE: 124 MMHG | OXYGEN SATURATION: 98 % | RESPIRATION RATE: 16 BRPM

## 2019-09-19 DIAGNOSIS — B19.10 HEP B W/O COMA: ICD-10-CM

## 2019-09-19 DIAGNOSIS — B19.10 HEP B W/O COMA: Primary | ICD-10-CM

## 2019-09-19 LAB
ALBUMIN SERPL-MCNC: 3.6 G/DL (ref 3.4–5)
ALBUMIN/GLOB SERPL: 1.3 {RATIO} (ref 0.8–1.7)
ALP SERPL-CCNC: 110 U/L (ref 45–117)
ALT SERPL-CCNC: 18 U/L (ref 16–61)
ANION GAP SERPL CALC-SCNC: 3 MMOL/L (ref 3–18)
AST SERPL-CCNC: 8 U/L (ref 10–38)
BASOPHILS # BLD: 0 K/UL (ref 0–0.1)
BASOPHILS NFR BLD: 1 % (ref 0–2)
BILIRUB DIRECT SERPL-MCNC: 0.1 MG/DL (ref 0–0.2)
BILIRUB SERPL-MCNC: 0.4 MG/DL (ref 0.2–1)
BUN SERPL-MCNC: 19 MG/DL (ref 7–18)
BUN/CREAT SERPL: 21 (ref 12–20)
CALCIUM SERPL-MCNC: 8.4 MG/DL (ref 8.5–10.1)
CHLORIDE SERPL-SCNC: 104 MMOL/L (ref 100–111)
CO2 SERPL-SCNC: 31 MMOL/L (ref 21–32)
CREAT SERPL-MCNC: 0.91 MG/DL (ref 0.6–1.3)
DIFFERENTIAL METHOD BLD: ABNORMAL
EOSINOPHIL # BLD: 0.3 K/UL (ref 0–0.4)
EOSINOPHIL NFR BLD: 7 % (ref 0–5)
ERYTHROCYTE [DISTWIDTH] IN BLOOD BY AUTOMATED COUNT: 13.5 % (ref 11.6–14.5)
GLOBULIN SER CALC-MCNC: 2.8 G/DL (ref 2–4)
GLUCOSE SERPL-MCNC: 79 MG/DL (ref 74–99)
HCT VFR BLD AUTO: 40.7 % (ref 36–48)
HGB BLD-MCNC: 13.4 G/DL (ref 13–16)
LYMPHOCYTES # BLD: 1.1 K/UL (ref 0.9–3.6)
LYMPHOCYTES NFR BLD: 32 % (ref 21–52)
MCH RBC QN AUTO: 32.5 PG (ref 24–34)
MCHC RBC AUTO-ENTMCNC: 32.9 G/DL (ref 31–37)
MCV RBC AUTO: 98.8 FL (ref 74–97)
MONOCYTES # BLD: 0.4 K/UL (ref 0.05–1.2)
MONOCYTES NFR BLD: 12 % (ref 3–10)
NEUTS SEG # BLD: 1.7 K/UL (ref 1.8–8)
NEUTS SEG NFR BLD: 48 % (ref 40–73)
PLATELET # BLD AUTO: 141 K/UL (ref 135–420)
PMV BLD AUTO: 8.3 FL (ref 9.2–11.8)
POTASSIUM SERPL-SCNC: 4.5 MMOL/L (ref 3.5–5.5)
PROT SERPL-MCNC: 6.4 G/DL (ref 6.4–8.2)
RBC # BLD AUTO: 4.12 M/UL (ref 4.7–5.5)
SODIUM SERPL-SCNC: 138 MMOL/L (ref 136–145)
WBC # BLD AUTO: 3.4 K/UL (ref 4.6–13.2)

## 2019-09-19 PROCEDURE — 36415 COLL VENOUS BLD VENIPUNCTURE: CPT

## 2019-09-19 PROCEDURE — 82107 ALPHA-FETOPROTEIN L3: CPT

## 2019-09-19 PROCEDURE — 80076 HEPATIC FUNCTION PANEL: CPT

## 2019-09-19 PROCEDURE — 85025 COMPLETE CBC W/AUTO DIFF WBC: CPT

## 2019-09-19 PROCEDURE — 87517 HEPATITIS B DNA QUANT: CPT

## 2019-09-19 PROCEDURE — 80048 BASIC METABOLIC PNL TOTAL CA: CPT

## 2019-09-19 RX ORDER — TENOFOVIR DISOPROXIL FUMARATE 300 MG/1
300 TABLET, FILM COATED ORAL DAILY
Qty: 90 TAB | Refills: 3 | Status: SHIPPED | OUTPATIENT
Start: 2019-09-19 | End: 2020-01-06 | Stop reason: SDUPTHER

## 2019-09-19 NOTE — PROGRESS NOTES
3340 [facility], MD, [address], Cite Cynthia Booth, MD Julia Ford, ZOHREH Bergeron, [facility]     April S Zulma, [facility]   MARIO Gasca, [facility]       [facility] Karri De Lauren 136    at [address], [facility] Ovi Prakash  22.    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 The active problem list, all pertinent past medical history, medications, liver histology, endoscopic studies, radiologic findings and laboratory findings related to the liver disorder were reviewed with the patient. Most recent was performed with ultrasound in 04/2018. Echogenic and slightly coarsened echotexture to the liver which can reflect  steatosis and/or chronic liver disease/cirrhosis. Poorly defined area of decreased echogenicity adjacent to gallbladder fossa, most suggestive of focal sparing. Elana Bucco has been previously seen. Shear wave elastography was performed with ultrasound in 09/2017. This suggests portal fibrosis with a Metavir score of F1. The elastography was repeated in 01/2018 and the suggested Metavir fibrosis score remains F1. The most recent laboratory studies indicate that the liver transaminases are normal, alkaline phosphatase is normal, tests of hepatic synthetic and metabolic function are normal, and the platelet count is normal.        HBV is being treated with Viread. HBV DNA in 09/2018 was undetectable.      The patient has no complaints which can be attributed to liver disease. The patient completes all daily activities without any functional limitations. The patient has not experienced fatigue, fevers, chills, shortness of breath, chest pain, pain in the right side over the liver, diffuse abdominal pain, nausea, vomiting, constipation, diarrhrea, dry eyes, dry mouth, arthralgias, myalgias, yellowing of the eyes or skin, itching, dark urine, problems concentrating, swelling of the abdomen, swelling of the lower extremities, hematemesis, or hematochezia. Patient Active Problem List    Diagnosis Date Noted    Chronic hepatitis B. Treated with Pegasys from 1/30/04 - 07/15/04.  Hypothyroidism     HBV. Participated in a clinical trial utilizing emtricitabine and Clevudine, relapse after treatment.  HBV.  started Epivir 08/05 with only a 2 log drop in HBV DNA as of 03/06. Adefovir substituted 06/06     Hypertension     Glaucoma     Hypercholesteremia     Gout      Current Outpatient Medications   Medication Sig Dispense Refill    tenofovir DISOPROXIL FUMARATE (VIREAD) 300 mg tablet Take 1 Tab by mouth daily. 90 Tab 3    losartan-hydroCHLOROthiazide (HYZAAR) 100-12.5 mg per tablet       amLODIPine (NORVASC) 5 mg tablet       LORATADINE (CLARITIN PO) Take  by mouth.  LEVOTHYROXINE SODIUM (LEVOTHROID PO) Take  by mouth.  NIACIN (NIASPAN EXTENDED-RELEASE PO) Take  by mouth.  OMEGA-3 FATTY ACIDS (FISH OIL CONCENTRATE PO) Take  by mouth.  ALLOPURINOL PO Take  by mouth.  multivitamin capsule Take 1 Cap by mouth daily.  metroNIDAZOLE 1 % glwp        No Known Allergies   SYSTEM REVIEW NOT RELATED TO LIVER DISEASE OR REVIEWED ABOVE:  Constitution systems: Negative for fever, chills, weight gain, weight loss. Eyes: Negative for visual changes. ENT: Negative for sore throat, painful swallowing. Respiratory: Negative for cough, hemoptysis, SOB. Cardiology: Negative for chest pain, palpitations.   GI:  Negative for constipation or diarrhea. : Negative for urinary frequency, dysuria, hematuria, nocturia. Skin: Negative for rash. Hematology: Negative for easy bruising, blood clots. Musculo-skelatal: Negative for back pain, muscle pain, weakness. Neurologic: Negative for headaches, dizziness, vertigo, memory problems not related to HE. Psychology: Negative for anxiety, depression. Family/Social History:  He does not drink alcohol and has history of intranasal drug use. He was possibly exposed to hepatitis B sometime in the 1970s. His family is unremarkable for liver disease. He is single with no children. He is a retired . Retired in 2005. Objective:     Visit Vitals  /71 (BP 1 Location: Right arm, BP Patient Position: Sitting)   Pulse 75   Temp 98.7 °F (37.1 °C) (Tympanic)   Resp 16   Ht 5' 8\" (1.727 m)   Wt 161 lb (73 kg)   SpO2 98%   BMI 24.48 kg/m²       General appearance: no distress  Eyes: sclera anicteric  ENT: no oral lesions, thyroid normal  Nodes: no adenopathy  Skin: no spider angiomata, jaundice, palmar erythema or xanthalasma  Respiratory: clear to auscultation bilaterally  Cardiovascular: regular heart rate, no murmurs, no JVD, no edema  Abdomen: soft, non-tender, liver size normal to percussion/palpation. Spleen not palpable.  No obvious ascites  Extremities: no muscle wasting, no gross arthritic changes  : not examined  Neurologic: alert and oriented, cranial nerves grossly intact, no asterixis    LABORATORY STUDIES:  Liver [place] of [address] [place] [address] 9/19/2019 3/19/2019   WBC 4.6 - 13.2 K/uL 3.4 (L) 4.6   ANC 1.8 - 8.0 K/UL 1.7 (L) 3.0   HGB 13.0 - 16.0 g/dL 13.4 15.1    - 420 K/uL 141 165   INR 0.8 - 1.2       AST 10 - 38 U/L 8 (L) 20   ALT 16 - 61 U/L 18 37   Alk Phos 45 - 117 U/L 110 106   Bili, Total 0.2 - 1.0 MG/DL 0.4 1.0   Bili, Direct 0.0 - 0.2 MG/DL 0.1 0.3 (H)   Albumin 3.4 - 5.0 g/dL 3.6 4.1   BUN 7.0 - 18 MG/DL 19 (H) 24 (H) Creat 0.6 - 1.3 MG/DL 0.91 0.92   Na 136 - 145 mmol/L 138 140   K 3.5 - 5.5 mmol/L 4.5 4.4   Cl 100 - 111 mmol/L 104 105   CO2 21 - 32 mmol/L 31 32   Glucose 74 - 99 mg/dL 79 94     Cancer Screening Latest Ref Rng & Units 9/19/2019 3/19/2019 9/20/2018   AFP, Serum 0.0 - 8.0 ng/mL 2.1 2.4 2.1   AFP-L3% 0.0 - 9.9 % Comment Comment Comment     Serologies and Other Pertinent Laboratories:  09/10:  HB e antigen negative, HB e antibody positive, HB surface antigen positive, HB surface antibody negative. Liver Biopsy:  2000:  Knodell score 13 (3452). 2002:  Bridging fibrosis with characteristics suggestive of WADSWORTH.     09/2017. TRANSIENT HEPATIC ELASTOGRAPHY: E Range: 6.26-10.63 kPa, E Mean: 7.92 kPa, E Median: 7.45 kPa, E Std: 1.31 kPa    10/2018. TRANSIENT HEPATIC ELASTOGRAPHY:   E Range: 5.44-7.91 kPa  E Mean: 6.63 kPa  E Median: 6.76 kPa  E Std: 0.69 kPa    Endoscopic Procedures:  None available. Radiology:  01/10:  Abdominal ultrasound - liver is diffusely echogenic consistent with fatty infiltration. No focal hepatic abnormalities. 01/12:  Abdominal ultrasound - liver is diffusely echogenic consistent with fatty infiltration. No focal hepatic abnormalities. 01/13:  Abdominal ultrasound - liver is diffusely echogenic consistent with fatty infiltration. No focal hepatic abnormalities. 01/2014: Abdominal ultrasound - liver is diffusely echogenic consistent with fatty infiltration. No focal hepatic abnormalities. 01/2015:  Abdominal ultrasound - liver is diffusely echogenic consistent with fatty infiltration. No focal hepatic abnormalities. 10/2015: Abdominal ultrasound - liver is diffusely echogenic consistent with fatty infiltration. No focal hepatic abnormalities. 3/2016:  Abdominal ultrasound. Moderately increased hepatic echogenicity likely related to steatosis and/or underlying chronic liver disease. No evidence of a liver mass. 9/2016:  Ultrasound of the abdomen.  There is increased echogenicity of the liver, similar to prior study. There is a new hypoechoic area within the right lobe the liver near the gallbladder fossa measuring 1.3 x 1.3 x 0.5 cm. Normal direction of blood flow in the portal vein. The liver measures 18.5 cm in length. 10/2016:  MRI of the liver. Hepatic steatosis. Small area of focal fatty sparing adjacent to gallbladder  fossa, very likely accounts for the finding seen on ultrasound. 2. 3 tiny hepatic lesions are too small to definitively characterize, though have features suggestive of benign hemangiomas. 04/2017. Ultrasound of the liver. Diffuse hepatic echogenicity suggesting either hepatic steatosis or other chronic hepatocellular disease. 09/2017. Ultrasound of the liver, performed with the elastography. Echogenic and slightly coarsened echotexture to the liver which can reflect steatosis and/or chronic liver disease/cirrhosis. Poorly defined area of decreased echogenicity adjacent to gallbladder fossa, most suggestive of focal sparing. 05/2018. Ultrasound of the liver. Echogenic liver parenchyma may be due to steatosis or underlying hepatocellular disease. An area of decreased echotexture adjacent to the gallbladder likely represents a region of fatty sparing similar to the prior study. 10/2018. Ultrasound of the liver. Echogenic liver parenchyma may be due to steatosis or underlying hepatocellular disease. There are areas of fatty sparing without definite mass. 04/2019. Ultrasound of the liver. Echogenic liver, nonspecific but most likely fatty infiltration. There are areas of relative sparing of fatty infiltration. No suspicious lesion. Other Testing:  None available. 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   The most recent laboratory studies indicate that the liver transaminases are normal, alkaline phosphatase is normal, tests of hepatic synthetic and metabolic function are normal, and the platelet count is depressed. HBV. HBV is being treated with Viread. HBV DNA in 09/2018 was undetectable. [address] screening. I explained that he should [address] screening with ultrasound and AFP-L3% every 6 months henceforth. Repeat imaging was ordered today. Shear wave elastography will also be performed annually. Elastography  can assess liver fibrosis and determine if a patient has advanced fibrosis or cirrhosis without the need for liver biopsy. Alcohol counseling provided. The need for vaccination against viral hepatitis A will be assessed with serologic and instituted as appropriate. All of the above issues were discussed with the patient. All questions were answered. The patient expressed a clear understanding of the above. Follow-up at the [facility] 32 in 6 months.     Wayne Key NP   Liver [place] of [address] [place] [address], [address] [place] [address]   [phone]

## 2019-09-20 LAB
AFP L3 MFR SERPL: NORMAL % (ref 0–9.9)
AFP SERPL-MCNC: 2.1 NG/ML (ref 0–8)

## 2019-09-21 LAB
HBV DNA SERPL NAA+PROBE-ACNC: NORMAL IU/ML
HBV DNA SERPL NAA+PROBE-LOG IU: NORMAL LOG10 IU/ML
TEST INFORMATION: NORMAL

## 2019-10-22 ENCOUNTER — HOSPITAL ENCOUNTER (OUTPATIENT)
Dept: ULTRASOUND IMAGING | Age: 69
Discharge: HOME | End: 2019-10-22
Payer: MEDICARE

## 2019-10-22 DIAGNOSIS — B19.10 HEP B W/O COMA: ICD-10-CM

## 2019-10-22 PROCEDURE — 76981 USE PARENCHYMA: CPT

## 2019-10-23 NOTE — PROGRESS NOTES
Please let him know that his ultrasound is unremarkable. No hepatic masses. Elastography suggests a Metavir fibrosis score of F2/F3, moderate hepatic fibrosis. Thank you.

## 2020-01-07 RX ORDER — TENOFOVIR DISOPROXIL FUMARATE 300 MG/1
300 TABLET, FILM COATED ORAL DAILY
Qty: 90 TAB | Refills: 3 | Status: SHIPPED | OUTPATIENT
Start: 2020-01-07 | End: 2020-09-03 | Stop reason: SDUPTHER

## 2020-03-03 ENCOUNTER — HOSPITAL ENCOUNTER (OUTPATIENT)
Dept: LAB | Age: 70
Discharge: HOME | End: 2020-03-03
Payer: MEDICARE

## 2020-03-03 ENCOUNTER — OFFICE VISIT (OUTPATIENT)
Dept: HEMATOLOGY | Age: 70
End: 2020-03-03

## 2020-03-03 VITALS
BODY MASS INDEX: 24.4 KG/M2 | HEART RATE: 89 BPM | TEMPERATURE: 97.4 F | OXYGEN SATURATION: 99 % | HEIGHT: 68 IN | DIASTOLIC BLOOD PRESSURE: 66 MMHG | WEIGHT: 161 LBS | SYSTOLIC BLOOD PRESSURE: 123 MMHG

## 2020-03-03 DIAGNOSIS — B19.10 HEP B W/O COMA: ICD-10-CM

## 2020-03-03 DIAGNOSIS — B19.10 HEP B W/O COMA: Primary | ICD-10-CM

## 2020-03-03 LAB
ALBUMIN SERPL-MCNC: 3.5 G/DL (ref 3.4–5)
ALBUMIN/GLOB SERPL: 1.1 {RATIO} (ref 0.8–1.7)
ALP SERPL-CCNC: 128 U/L (ref 45–117)
ALT SERPL-CCNC: 22 U/L (ref 16–61)
ANION GAP SERPL CALC-SCNC: 2 MMOL/L (ref 3–18)
AST SERPL-CCNC: 10 U/L (ref 10–38)
BASOPHILS # BLD: 0 K/UL (ref 0–0.1)
BASOPHILS NFR BLD: 1 % (ref 0–2)
BILIRUB DIRECT SERPL-MCNC: 0.2 MG/DL (ref 0–0.2)
BILIRUB SERPL-MCNC: 0.4 MG/DL (ref 0.2–1)
BUN SERPL-MCNC: 24 MG/DL (ref 7–18)
BUN/CREAT SERPL: 32 (ref 12–20)
CALCIUM SERPL-MCNC: 8.7 MG/DL (ref 8.5–10.1)
CHLORIDE SERPL-SCNC: 104 MMOL/L (ref 100–111)
CO2 SERPL-SCNC: 33 MMOL/L (ref 21–32)
CREAT SERPL-MCNC: 0.75 MG/DL (ref 0.6–1.3)
DIFFERENTIAL METHOD BLD: ABNORMAL
EOSINOPHIL # BLD: 0.3 K/UL (ref 0–0.4)
EOSINOPHIL NFR BLD: 8 % (ref 0–5)
ERYTHROCYTE [DISTWIDTH] IN BLOOD BY AUTOMATED COUNT: 13.7 % (ref 11.6–14.5)
GLOBULIN SER CALC-MCNC: 3.2 G/DL (ref 2–4)
GLUCOSE SERPL-MCNC: 91 MG/DL (ref 74–99)
HCT VFR BLD AUTO: 41.5 % (ref 36–48)
HGB BLD-MCNC: 13.2 G/DL (ref 13–16)
LYMPHOCYTES # BLD: 1 K/UL (ref 0.9–3.6)
LYMPHOCYTES NFR BLD: 26 % (ref 21–52)
MCH RBC QN AUTO: 31.4 PG (ref 24–34)
MCHC RBC AUTO-ENTMCNC: 31.8 G/DL (ref 31–37)
MCV RBC AUTO: 98.8 FL (ref 74–97)
MONOCYTES # BLD: 0.5 K/UL (ref 0.05–1.2)
MONOCYTES NFR BLD: 13 % (ref 3–10)
NEUTS SEG # BLD: 2.1 K/UL (ref 1.8–8)
NEUTS SEG NFR BLD: 52 % (ref 40–73)
PLATELET # BLD AUTO: 152 K/UL (ref 135–420)
PMV BLD AUTO: 8.4 FL (ref 9.2–11.8)
POTASSIUM SERPL-SCNC: 4.3 MMOL/L (ref 3.5–5.5)
PROT SERPL-MCNC: 6.7 G/DL (ref 6.4–8.2)
RBC # BLD AUTO: 4.2 M/UL (ref 4.7–5.5)
SODIUM SERPL-SCNC: 139 MMOL/L (ref 136–145)
WBC # BLD AUTO: 3.9 K/UL (ref 4.6–13.2)

## 2020-03-03 PROCEDURE — 82107 ALPHA-FETOPROTEIN L3: CPT

## 2020-03-03 PROCEDURE — 36415 COLL VENOUS BLD VENIPUNCTURE: CPT

## 2020-03-03 PROCEDURE — 87517 HEPATITIS B DNA QUANT: CPT

## 2020-03-03 PROCEDURE — 80048 BASIC METABOLIC PNL TOTAL CA: CPT

## 2020-03-03 PROCEDURE — 80076 HEPATIC FUNCTION PANEL: CPT

## 2020-03-03 PROCEDURE — 85025 COMPLETE CBC W/AUTO DIFF WBC: CPT

## 2020-03-03 NOTE — PROGRESS NOTES
3340 [facility], MD, [address] , Gordy Booth, [address] [place] [address], ZOHREH Reyes [facility]     Haily Maya [facility]   MARIO Huynh, [facility]       Everette Galvin Karri De Lauren 136    at [address], [address] [place] [address]    1400 [facility] 22.    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 The active problem list, all pertinent past medical history, medications, liver histology, endoscopic studies, radiologic findings and laboratory findings related to the liver disorder were reviewed with the patient. Most recent was performed with ultrasound in 04/2018. Echogenic and slightly coarsened echotexture to the liver which can reflect steatosis and/or chronic liver disease/cirrhosis. Poorly defined area of decreased echogenicity adjacent to gallbladder fossa, most suggestive of focal sparing. This has been previously seen. Shear wave elastography was performed with ultrasound in 09/2017. This suggests portal fibrosis with a Metavir score of F1. The elastography was repeated in 01/2018 and the suggested Metavir fibrosis score remains F1. Elastography from 10/2019 suggested a Metavir fibrosis score of F3. The most recent laboratory studies indicate that the liver transaminases are normal, alkaline phosphatase is normal, tests of hepatic synthetic and metabolic function are normal, and the platelet count is normal.        HBV is being treated with Viread.   HBV DNA in 09/2019 was undetectable. The patient has no complaints which can be attributed to liver disease. The patient completes all daily activities without any functional limitations. The patient has not experienced fatigue, fevers, chills, shortness of breath, chest pain, pain in the right side over the liver, diffuse abdominal pain, nausea, vomiting, constipation, diarrhrea, dry eyes, dry mouth, arthralgias, myalgias, yellowing of the eyes or skin, itching, dark urine, problems concentrating, swelling of the abdomen, swelling of the lower extremities, hematemesis, or hematochezia. Patient Active Problem List    Diagnosis Date Noted    Chronic hepatitis B. Treated with Pegasys from 1/30/04 - 07/15/04.  Hypothyroidism     HBV. Participated in a clinical trial utilizing emtricitabine and Clevudine, relapse after treatment.  HBV.  started Epivir 08/05 with only a 2 log drop in HBV DNA as of 03/06. Adefovir substituted 06/06     Hypertension     Glaucoma     Hypercholesteremia     Gout      Current Outpatient Medications   Medication Sig Dispense Refill    tenofovir DISOPROXIL FUMARATE (VIREAD) 300 mg tablet Take 1 Tab by mouth daily. 90 Tab 3    losartan-hydroCHLOROthiazide (HYZAAR) 100-12.5 mg per tablet       amLODIPine (NORVASC) 5 mg tablet       metroNIDAZOLE 1 % glwp       LORATADINE (CLARITIN PO) Take  by mouth.  LEVOTHYROXINE SODIUM (LEVOTHROID PO) Take  by mouth.  NIACIN (NIASPAN EXTENDED-RELEASE PO) Take  by mouth.  OMEGA-3 FATTY ACIDS (FISH OIL CONCENTRATE PO) Take  by mouth.  ALLOPURINOL PO Take  by mouth. No Known Allergies   SYSTEM REVIEW NOT RELATED TO LIVER DISEASE OR REVIEWED ABOVE:  Constitution systems: Negative for fever, chills, weight gain, weight loss. Eyes: Negative for visual changes. ENT: Negative for sore throat, painful swallowing. Respiratory: Negative for cough, hemoptysis, SOB. Cardiology: Negative for chest pain, palpitations.   GI: Negative for constipation or diarrhea. : Negative for urinary frequency, dysuria, hematuria, nocturia. Skin: Negative for rash. Hematology: Negative for easy bruising, blood clots. Musculo-skelatal: Negative for back pain, muscle pain, weakness. Neurologic: Negative for headaches, dizziness, vertigo, memory problems not related to HE. Psychology: Negative for anxiety, depression. Family/Social History:  He does not drink alcohol and has history of intranasal drug use. He was possibly exposed to hepatitis B sometime in the 1970s. His family is unremarkable for liver disease. He is single with no children. He is a retired . Retired in 2005. Objective:     Visit Vitals  /66   Pulse 89   Temp 97.4 °F (36.3 °C) (Tympanic)   Ht 5' 8\" (1.727 m)   Wt 161 lb (73 kg)   SpO2 99%   BMI 24.48 kg/m²       General appearance: no distress  Eyes: sclera anicteric  ENT: no oral lesions, thyroid normal  Nodes: no adenopathy  Skin: no spider angiomata, jaundice, palmar erythema or xanthalasma  Respiratory: clear to auscultation bilaterally  Cardiovascular: regular heart rate, no murmurs, no JVD, no edema  Abdomen: soft, non-tender, liver size normal to percussion/palpation. Spleen not palpable.  No obvious ascites  Extremities: no muscle wasting, no gross arthritic changes  : not examined  Neurologic: alert and oriented, cranial nerves grossly intact, no asterixis    LABORATORY STUDIES:  Liver [place] of [address] Units 3/3/2020 9/19/2019 3/19/2019   WBC 4.6 - 13.2 K/uL 3.9 (L) 3.4 (L) 4.6   ANC 1.8 - 8.0 K/UL 2.1 1.7 (L) 3.0   HGB 13.0 - 16.0 g/dL 13.2 13.4 15.1    - 420 K/uL 152 141 165   INR 0.8 - 1.2        AST 10 - 38 U/L 10 8 (L) 20   ALT 16 - 61 U/L 22 18 37   Alk Phos 45 - 117 U/L 128 (H) 110 106   Bili, Total 0.2 - 1.0 MG/DL 0.4 0.4 1.0   Bili, Direct 0.0 - 0.2 MG/DL 0.2 0.1 0.3 (H)   Albumin 3.4 - 5.0 g/dL 3.5 3.6 4.1   BUN 7.0 - 18 MG/DL 24 (H) 19 (H) 24 (H)   Creat 0.6 - 1.3 MG/DL 0.75 0.91 0.92   Na 136 - 145 mmol/L 139 138 140   K 3.5 - 5.5 mmol/L 4.3 4.5 4.4   Cl 100 - 111 mmol/L 104 104 105   CO2 21 - 32 mmol/L 33 (H) 31 32   Glucose 74 - 99 mg/dL 91 79 94       Cancer Screening Latest Ref Rng & Units 9/19/2019 3/19/2019 9/20/2018   AFP, Serum 0.0 - 8.0 ng/mL 2.1 2.4 2.1   AFP-L3% 0.0 - 9.9 % Comment Comment Comment     Serologies and Other Pertinent Laboratories:  09/10:  HB e antigen negative, HB e antibody positive, HB surface antigen positive, HB surface antibody negative. Liver Biopsy:  2000:  Knodell score 13 (3234). 2002:  Bridging fibrosis with characteristics suggestive of WADSWORTH.     09/2017. TRANSIENT HEPATIC ELASTOGRAPHY: E Range: 6.26-10.63 kPa, E Mean: 7.92 kPa, E Median: 7.45 kPa, E Std: 1.31 kPa    10/2018. TRANSIENT HEPATIC ELASTOGRAPHY:   E Range: 5.44-7.91 kPa  E Mean: 6.63 kPa  E Median: 6.76 kPa  E Std: 0.69 kPa    10/2019. TRANSIENT HEPATIC ELASTOGRAPHY:   E Range: 8.88-13.44 kPa, previous 5.44-7.91 kPA  E Mean: 10.54 kPa, previous 6.63 kPA  E Median: 10.15 kPa, previous 6.76 kPA  E Std: 1.58 kPa, previous 0.69 kPA    Endoscopic Procedures:  None available. Radiology:  01/10:  Abdominal ultrasound - liver is diffusely echogenic consistent with fatty infiltration. No focal hepatic abnormalities. 01/12:  Abdominal ultrasound - liver is diffusely echogenic consistent with fatty infiltration. No focal hepatic abnormalities. 01/13:  Abdominal ultrasound - liver is diffusely echogenic consistent with fatty infiltration. No focal hepatic abnormalities. 01/2014: Abdominal ultrasound - liver is diffusely echogenic consistent with fatty infiltration. No focal hepatic abnormalities. 01/2015:  Abdominal ultrasound - liver is diffusely echogenic consistent with fatty infiltration. No focal hepatic abnormalities. 10/2015: Abdominal ultrasound - liver is diffusely echogenic consistent with fatty infiltration.   No focal hepatic abnormalities. 3/2016:  Abdominal ultrasound. Moderately increased hepatic echogenicity likely related to steatosis and/or underlying chronic liver disease. No evidence of a liver mass. 9/2016:  Ultrasound of the abdomen. There is increased echogenicity of the liver, similar to prior study. There is a new hypoechoic area within the right lobe the liver near the gallbladder fossa measuring 1.3 x 1.3 x 0.5 cm. Normal direction of blood flow in the portal vein. The liver measures 18.5 cm in length. 10/2016:  MRI of the liver. Hepatic steatosis. Small area of focal fatty sparing adjacent to gallbladder  fossa, very likely accounts for the finding seen on ultrasound. 2. 3 tiny hepatic lesions are too small to definitively characterize, though have features suggestive of benign hemangiomas. 04/2017. Ultrasound of the liver. Diffuse hepatic echogenicity suggesting either hepatic steatosis or other chronic hepatocellular disease. 09/2017. Ultrasound of the liver, performed with the elastography. Echogenic and slightly coarsened echotexture to the liver which can reflect steatosis and/or chronic liver disease/cirrhosis. Poorly defined area of decreased echogenicity adjacent to gallbladder fossa, most suggestive of focal sparing. 05/2018. Ultrasound of the liver. Echogenic liver parenchyma may be due to steatosis or underlying hepatocellular disease. An area of decreased echotexture adjacent to the gallbladder likely represents a region of fatty sparing similar to the prior study. 10/2018. Ultrasound of the liver. Echogenic liver parenchyma may be due to steatosis or underlying hepatocellular disease. There are areas of fatty sparing without definite mass. 04/2019. Ultrasound of the liver. Echogenic liver, nonspecific but most likely fatty infiltration. There are areas of relative sparing of fatty infiltration. No suspicious lesion. 10/2019. Ultrasound of the liver. Hepatomegaly.   Increased hepatic echogenicity, correlating with known hepatocellular dysfunction and hepatitis. No focal hepatic lesion is seen. Other Testing:  None available. 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 The most recent laboratory studies indicate that the liver transaminases are normal, alkaline phosphatase is elevated, tests of hepatic synthetic and metabolic function are normal, and the platelet count is normal.  Will perform laboratory testing to monitor liver function and degree of liver injury. This will include hepatic panel, a CBC w/ diff, a BMP, an AFP-L3%,  and an HBV PCR.      HBV. HBV is being treated with Viread. HBV DNA in 09/2019 was undetectable. [facility] [address] screening. I explained that he should [address] screening with ultrasound and AFP-L3% every 6 months henceforth. Repeat imaging was ordered today to be performed in 04/2020. The need to perform an assessment of liver fibrosis was discussed with the patient. Fibroscan can assess liver fibrosis and determine if a patient has advanced fibrosis or cirrhosis without the need for liver biopsy. This can be performed in the office. This will be completed at his next office visit in 6 months. Alcohol counseling provided. The need for vaccination against viral hepatitis A will be assessed with serologic and instituted as appropriate. All of the above issues were discussed with the patient. All questions were answered. The patient expressed a clear understanding of the above. Follow-up at the [facility] in 6 months.       MARIO Red  Liver [place] of [facility]  4 [facility], [address] [facility]   [address], 3100 [facility]   [phone]

## 2020-03-04 LAB
AFP L3 MFR SERPL: NORMAL % (ref 0–9.9)
AFP SERPL-MCNC: 2.3 NG/ML (ref 0–8)
HBV DNA SERPL NAA+PROBE-ACNC: NORMAL IU/ML
HBV DNA SERPL NAA+PROBE-LOG IU: NORMAL LOG10 IU/ML
TEST INFORMATION: NORMAL

## 2020-06-01 ENCOUNTER — HOSPITAL ENCOUNTER (OUTPATIENT)
Dept: ULTRASOUND IMAGING | Age: 70
Discharge: HOME | End: 2020-06-01
Payer: MEDICARE

## 2020-06-01 DIAGNOSIS — B19.10 HEP B W/O COMA: ICD-10-CM

## 2020-06-01 PROCEDURE — 76705 ECHO EXAM OF ABDOMEN: CPT

## 2020-06-22 ENCOUNTER — TELEPHONE (OUTPATIENT)
Dept: HEMATOLOGY | Age: 70
End: 2020-06-22

## 2020-09-03 ENCOUNTER — HOSPITAL ENCOUNTER (OUTPATIENT)
Dept: LAB | Age: 70
Discharge: HOME | End: 2020-09-03
Payer: MEDICARE

## 2020-09-03 ENCOUNTER — OFFICE VISIT (OUTPATIENT)
Dept: HEMATOLOGY | Age: 70
End: 2020-09-03

## 2020-09-03 VITALS
SYSTOLIC BLOOD PRESSURE: 127 MMHG | WEIGHT: 160.13 LBS | HEART RATE: 82 BPM | TEMPERATURE: 97.1 F | BODY MASS INDEX: 24.35 KG/M2 | OXYGEN SATURATION: 96 % | DIASTOLIC BLOOD PRESSURE: 66 MMHG

## 2020-09-03 DIAGNOSIS — B19.10 HEP B W/O COMA: Primary | ICD-10-CM

## 2020-09-03 DIAGNOSIS — B19.10 HEP B W/O COMA: ICD-10-CM

## 2020-09-03 LAB
ALBUMIN SERPL-MCNC: 3.6 G/DL (ref 3.4–5)
ALBUMIN/GLOB SERPL: 1.3 {RATIO} (ref 0.8–1.7)
ALP SERPL-CCNC: 123 U/L (ref 45–117)
ALT SERPL-CCNC: 20 U/L (ref 16–61)
ANION GAP SERPL CALC-SCNC: 5 MMOL/L (ref 3–18)
AST SERPL-CCNC: 11 U/L (ref 10–38)
BASOPHILS # BLD: 0 K/UL (ref 0–0.1)
BASOPHILS NFR BLD: 1 % (ref 0–2)
BILIRUB DIRECT SERPL-MCNC: 0.2 MG/DL (ref 0–0.2)
BILIRUB SERPL-MCNC: 0.4 MG/DL (ref 0.2–1)
BUN SERPL-MCNC: 16 MG/DL (ref 7–18)
BUN/CREAT SERPL: 20 (ref 12–20)
CALCIUM SERPL-MCNC: 8.8 MG/DL (ref 8.5–10.1)
CHLORIDE SERPL-SCNC: 104 MMOL/L (ref 100–111)
CO2 SERPL-SCNC: 31 MMOL/L (ref 21–32)
CREAT SERPL-MCNC: 0.81 MG/DL (ref 0.6–1.3)
DIFFERENTIAL METHOD BLD: ABNORMAL
EOSINOPHIL # BLD: 0.4 K/UL (ref 0–0.4)
EOSINOPHIL NFR BLD: 9 % (ref 0–5)
ERYTHROCYTE [DISTWIDTH] IN BLOOD BY AUTOMATED COUNT: 14.3 % (ref 11.6–14.5)
GLOBULIN SER CALC-MCNC: 2.8 G/DL (ref 2–4)
GLUCOSE SERPL-MCNC: 97 MG/DL (ref 74–99)
HCT VFR BLD AUTO: 37.7 % (ref 36–48)
HGB BLD-MCNC: 12.4 G/DL (ref 13–16)
LYMPHOCYTES # BLD: 1.3 K/UL (ref 0.9–3.6)
LYMPHOCYTES NFR BLD: 28 % (ref 21–52)
MCH RBC QN AUTO: 32.4 PG (ref 24–34)
MCHC RBC AUTO-ENTMCNC: 32.9 G/DL (ref 31–37)
MCV RBC AUTO: 98.4 FL (ref 74–97)
MONOCYTES # BLD: 0.4 K/UL (ref 0.05–1.2)
MONOCYTES NFR BLD: 9 % (ref 3–10)
NEUTS SEG # BLD: 2.4 K/UL (ref 1.8–8)
NEUTS SEG NFR BLD: 53 % (ref 40–73)
PLATELET # BLD AUTO: 154 K/UL (ref 135–420)
PMV BLD AUTO: 8.1 FL (ref 9.2–11.8)
POTASSIUM SERPL-SCNC: 4.4 MMOL/L (ref 3.5–5.5)
PROT SERPL-MCNC: 6.4 G/DL (ref 6.4–8.2)
RBC # BLD AUTO: 3.83 M/UL (ref 4.7–5.5)
SODIUM SERPL-SCNC: 140 MMOL/L (ref 136–145)
WBC # BLD AUTO: 4.5 K/UL (ref 4.6–13.2)

## 2020-09-03 PROCEDURE — 85025 COMPLETE CBC W/AUTO DIFF WBC: CPT

## 2020-09-03 PROCEDURE — 80076 HEPATIC FUNCTION PANEL: CPT

## 2020-09-03 PROCEDURE — 36415 COLL VENOUS BLD VENIPUNCTURE: CPT

## 2020-09-03 PROCEDURE — 80048 BASIC METABOLIC PNL TOTAL CA: CPT

## 2020-09-03 PROCEDURE — 87517 HEPATITIS B DNA QUANT: CPT

## 2020-09-03 PROCEDURE — 82107 ALPHA-FETOPROTEIN L3: CPT

## 2020-09-03 RX ORDER — TENOFOVIR DISOPROXIL FUMARATE 300 MG/1
300 TABLET, FILM COATED ORAL DAILY
Qty: 90 TAB | Refills: 3 | Status: SHIPPED | OUTPATIENT
Start: 2020-09-03 | End: 2021-08-03

## 2020-09-04 LAB
AFP L3 MFR SERPL: NORMAL % (ref 0–9.9)
AFP SERPL-MCNC: 2.6 NG/ML (ref 0–8)

## 2020-12-02 ENCOUNTER — HOSPITAL ENCOUNTER (OUTPATIENT)
Dept: ULTRASOUND IMAGING | Age: 70
Discharge: HOME | End: 2020-12-02
Payer: MEDICARE

## 2020-12-02 DIAGNOSIS — B19.10 HEP B W/O COMA: ICD-10-CM

## 2020-12-02 PROCEDURE — 76705 ECHO EXAM OF ABDOMEN: CPT

## 2020-12-10 ENCOUNTER — TELEPHONE (OUTPATIENT)
Dept: HEMATOLOGY | Age: 70
End: 2020-12-10

## 2021-03-18 ENCOUNTER — HOSPITAL ENCOUNTER (OUTPATIENT)
Dept: LAB | Age: 71
Discharge: HOME | End: 2021-03-18
Payer: MEDICARE

## 2021-03-18 ENCOUNTER — OFFICE VISIT (OUTPATIENT)
Dept: HEMATOLOGY | Age: 71
End: 2021-03-18
Payer: MEDICARE

## 2021-03-18 VITALS
HEART RATE: 81 BPM | BODY MASS INDEX: 24.25 KG/M2 | WEIGHT: 160 LBS | HEIGHT: 68 IN | SYSTOLIC BLOOD PRESSURE: 111 MMHG | RESPIRATION RATE: 16 BRPM | DIASTOLIC BLOOD PRESSURE: 64 MMHG | OXYGEN SATURATION: 99 %

## 2021-03-18 DIAGNOSIS — B19.10 HEP B W/O COMA: ICD-10-CM

## 2021-03-18 DIAGNOSIS — B19.10 HEP B W/O COMA: Primary | ICD-10-CM

## 2021-03-18 LAB
ALBUMIN SERPL-MCNC: 3.8 G/DL (ref 3.4–5)
ALBUMIN/GLOB SERPL: 1.2 {RATIO} (ref 0.8–1.7)
ALP SERPL-CCNC: 136 U/L (ref 45–117)
ALT SERPL-CCNC: 23 U/L (ref 16–61)
ANION GAP SERPL CALC-SCNC: 2 MMOL/L (ref 3–18)
AST SERPL-CCNC: 10 U/L (ref 10–38)
BASOPHILS # BLD: 0 K/UL (ref 0–0.1)
BASOPHILS NFR BLD: 1 % (ref 0–2)
BILIRUB DIRECT SERPL-MCNC: 0.2 MG/DL (ref 0–0.2)
BILIRUB SERPL-MCNC: 0.5 MG/DL (ref 0.2–1)
BUN SERPL-MCNC: 16 MG/DL (ref 7–18)
BUN/CREAT SERPL: 16 (ref 12–20)
CALCIUM SERPL-MCNC: 8.7 MG/DL (ref 8.5–10.1)
CHLORIDE SERPL-SCNC: 102 MMOL/L (ref 100–111)
CO2 SERPL-SCNC: 33 MMOL/L (ref 21–32)
CREAT SERPL-MCNC: 0.98 MG/DL (ref 0.6–1.3)
DIFFERENTIAL METHOD BLD: ABNORMAL
EOSINOPHIL # BLD: 0.3 K/UL (ref 0–0.4)
EOSINOPHIL NFR BLD: 8 % (ref 0–5)
ERYTHROCYTE [DISTWIDTH] IN BLOOD BY AUTOMATED COUNT: 13.6 % (ref 11.6–14.5)
GLOBULIN SER CALC-MCNC: 3.1 G/DL (ref 2–4)
GLUCOSE SERPL-MCNC: 91 MG/DL (ref 74–99)
HCT VFR BLD AUTO: 43.5 % (ref 36–48)
HGB BLD-MCNC: 14.1 G/DL (ref 13–16)
LYMPHOCYTES # BLD: 1.2 K/UL (ref 0.9–3.6)
LYMPHOCYTES NFR BLD: 28 % (ref 21–52)
MCH RBC QN AUTO: 32.7 PG (ref 24–34)
MCHC RBC AUTO-ENTMCNC: 32.4 G/DL (ref 31–37)
MCV RBC AUTO: 100.9 FL (ref 74–97)
MONOCYTES # BLD: 0.4 K/UL (ref 0.05–1.2)
MONOCYTES NFR BLD: 10 % (ref 3–10)
NEUTS SEG # BLD: 2.3 K/UL (ref 1.8–8)
NEUTS SEG NFR BLD: 53 % (ref 40–73)
PLATELET # BLD AUTO: 167 K/UL (ref 135–420)
PMV BLD AUTO: 8.5 FL (ref 9.2–11.8)
POTASSIUM SERPL-SCNC: 4 MMOL/L (ref 3.5–5.5)
PROT SERPL-MCNC: 6.9 G/DL (ref 6.4–8.2)
RBC # BLD AUTO: 4.31 M/UL (ref 4.7–5.5)
SODIUM SERPL-SCNC: 137 MMOL/L (ref 136–145)
WBC # BLD AUTO: 4.2 K/UL (ref 4.6–13.2)

## 2021-03-18 PROCEDURE — G8427 DOCREV CUR MEDS BY ELIG CLIN: HCPCS | Performed by: NURSE PRACTITIONER

## 2021-03-18 PROCEDURE — 85025 COMPLETE CBC W/AUTO DIFF WBC: CPT

## 2021-03-18 PROCEDURE — G8752 SYS BP LESS 140: HCPCS | Performed by: NURSE PRACTITIONER

## 2021-03-18 PROCEDURE — G8536 NO DOC ELDER MAL SCRN: HCPCS | Performed by: NURSE PRACTITIONER

## 2021-03-18 PROCEDURE — 1101F PT FALLS ASSESS-DOCD LE1/YR: CPT | Performed by: NURSE PRACTITIONER

## 2021-03-18 PROCEDURE — 80076 HEPATIC FUNCTION PANEL: CPT

## 2021-03-18 PROCEDURE — G8432 DEP SCR NOT DOC, RNG: HCPCS | Performed by: NURSE PRACTITIONER

## 2021-03-18 PROCEDURE — 99214 OFFICE O/P EST MOD 30 MIN: CPT | Performed by: NURSE PRACTITIONER

## 2021-03-18 PROCEDURE — G8754 DIAS BP LESS 90: HCPCS | Performed by: NURSE PRACTITIONER

## 2021-03-18 PROCEDURE — G8420 CALC BMI NORM PARAMETERS: HCPCS | Performed by: NURSE PRACTITIONER

## 2021-03-18 PROCEDURE — 80048 BASIC METABOLIC PNL TOTAL CA: CPT

## 2021-03-18 PROCEDURE — 82107 ALPHA-FETOPROTEIN L3: CPT

## 2021-03-18 PROCEDURE — 3017F COLORECTAL CA SCREEN DOC REV: CPT | Performed by: NURSE PRACTITIONER

## 2021-03-18 PROCEDURE — 87517 HEPATITIS B DNA QUANT: CPT

## 2021-03-18 PROCEDURE — G0463 HOSPITAL OUTPT CLINIC VISIT: HCPCS | Performed by: NURSE PRACTITIONER

## 2021-03-18 PROCEDURE — 36415 COLL VENOUS BLD VENIPUNCTURE: CPT

## 2021-03-18 NOTE — PROGRESS NOTES
[address] [facility], MD, [address], Cite Mongi Slim, Reyes Blossom, MD Tanner Monks, PA-C Donice Aquas, [facility]     Haily NUGENT Zulma, [facility]   MANUEL Hernandez-MITA    Cierra Laird, [facility]       Everettealex Patelutado Karri De Lauren 136    at [facility]    [address] [facility], [address] Ovi Prakash  22.    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 The active problem list, all pertinent past medical history, medications, liver histology, endoscopic studies, radiologic findings and laboratory findings related to the liver disorder were reviewed with the patient. Most recent imaging was performed with ultrasound in 12/2020. Heterogeneously mild increased hepatic parenchymal echotexture. No evidence of solid mass. Shear wave elastography was performed with ultrasound in 09/2017. This suggests portal fibrosis with a Metavir score of F1. The elastography was repeated in 01/2018 and the suggested Metavir fibrosis score remains F1. Elastography from 10/2019 suggested a Metavir fibrosis score of F3. Fibroscan was performed in the office in 09/2020. Results of the scan indicate F2/F3. There is no evidence of fatty liver. The most recent laboratory studies indicate that the liver transaminases are normal, alkaline phosphatase is normal, tests of hepatic synthetic and metabolic function are normal, and the platelet count is normal.        HBV is being treated with Viread. HBV DNA in 03/2021 was undetectable.      The patient has no complaints which can be attributed to liver disease. The patient completes all daily activities without any functional limitations. The patient has not experienced fatigue, fevers, chills, shortness of breath, chest pain, pain in the right side over the liver, diffuse abdominal pain, nausea, vomiting, constipation, diarrhrea, dry eyes, dry mouth, arthralgias, myalgias, yellowing of the eyes or skin, itching, dark urine, problems concentrating, swelling of the abdomen, swelling of the lower extremities, hematemesis, or hematochezia. 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 The most recent laboratory studies indicate that the liver transaminases are normal, alkaline phosphatase is elevated, tests of hepatic synthetic and metabolic function are normal, and the platelet count is normal.  Will perform laboratory testing to monitor liver function and degree of liver injury. This will include hepatic panel, a CBC w/ diff, a BMP, an AFP-L3%,  and an HBV PCR.      HBV. HBV is being treated with Viread. HBV DNA in 09/2020 was undetectable. [facility] [address] screening. I explained that he should [facility] [address] screening with ultrasound and AFP-L3% every 6 months henceforth. Repeat imaging was ordered today to be performed in 12/2020. The need to perform an assessment of liver fibrosis was discussed with the patient. Fibroscan can assess liver fibrosis and determine if a patient has advanced fibrosis or cirrhosis without the need for liver biopsy. This was performed in the office in 09/2020. Results of the scan demonstrate a Metavir fibrosis score of F2/F3. There is no evidence of fatty liver disease. Elastography can be performed annually or as often as clinically indicated moving forward. Alcohol counseling provided. The need for vaccination against viral hepatitis A will be assessed with serologic and instituted as appropriate. All of the above issues were discussed with the patient.   All questions were answered. The patient expressed a clear understanding of the above. Patient Active Problem List    Diagnosis Date Noted    Chronic hepatitis B. Treated with Pegasys from 1/30/04 - 07/15/04.  Hypothyroidism     HBV. Participated in a clinical trial utilizing emtricitabine and Clevudine, relapse after treatment.  HBV.  started Epivir 08/05 with only a 2 log drop in HBV DNA as of 03/06. Adefovir substituted 06/06     Hypertension     Glaucoma     Hypercholesteremia     Gout      Current Outpatient Medications   Medication Sig Dispense Refill    tenofovir DISOPROXIL FUMARATE (Viread) 300 mg tablet Take 1 Tab by mouth daily. 90 Tab 3    losartan-hydroCHLOROthiazide (HYZAAR) 100-12.5 mg per tablet       amLODIPine (NORVASC) 5 mg tablet       metroNIDAZOLE 1 % glwp       LORATADINE (CLARITIN PO) Take  by mouth.  LEVOTHYROXINE SODIUM (LEVOTHROID PO) Take  by mouth.  NIACIN (NIASPAN EXTENDED-RELEASE PO) Take  by mouth.  OMEGA-3 FATTY ACIDS (FISH OIL CONCENTRATE PO) Take  by mouth.  ALLOPURINOL PO Take  by mouth. No Known Allergies   SYSTEM REVIEW NOT RELATED TO LIVER DISEASE OR REVIEWED ABOVE:  Constitution systems: Negative for fever, chills, weight gain, weight loss. Eyes: Negative for visual changes. ENT: Negative for sore throat, painful swallowing. Respiratory: Negative for cough, hemoptysis, SOB. Cardiology: Negative for chest pain, palpitations. GI:  Negative for constipation or diarrhea. : Negative for urinary frequency, dysuria, hematuria, nocturia. Skin: Negative for rash. Hematology: Negative for easy bruising, blood clots. Musculo-skelatal: Negative for back pain, muscle pain, weakness. Neurologic: Negative for headaches, dizziness, vertigo, memory problems not related to HE. Psychology: Negative for anxiety, depression. Family/Social History:  He does not drink alcohol and has history of intranasal drug use.   He was possibly exposed to hepatitis B sometime in the 1970s. His family is unremarkable for liver disease. He is single with no children. He is a retired . Retired in 2005. Objective:     Visit Vitals  /64 (BP 1 Location: Right arm, BP Patient Position: Sitting, BP Cuff Size: Small adult)   Pulse 81   Resp 16   Ht 5' 8\" (1.727 m)   Wt 160 lb (72.6 kg)   SpO2 99%   BMI 24.33 kg/m²       General appearance: no distress  Eyes: sclera anicteric  ENT: no oral lesions, thyroid normal  Nodes: no adenopathy  Skin: no spider angiomata, jaundice, palmar erythema or xanthalasma  Respiratory: clear to auscultation bilaterally  Cardiovascular: regular heart rate, no murmurs, no JVD, no edema  Abdomen: soft, non-tender, liver size normal to percussion/palpation. Spleen not palpable.  No obvious ascites  Extremities: no muscle wasting, no gross arthritic changes  : not examined  Neurologic: alert and oriented, cranial nerves grossly intact, no asterixis    LABORATORY STUDIES:  Liver [place] of [address] [place] [address] 3/18/2021 9/3/2020 3/3/2020   WBC 4.6 - 13.2 K/uL 4.2 (L) 4.5 (L) 3.9 (L)   ANC 1.8 - 8.0 K/UL 2.3 2.4 2.1   HGB 13.0 - 16.0 g/dL 14.1 12.4 (L) 13.2    - 420 K/uL 167 154 152   INR 0.8 - 1.2        AST 10 - 38 U/L 10 11 10   ALT 16 - 61 U/L 23 20 22   Alk Phos 45 - 117 U/L 136 (H) 123 (H) 128 (H)   Bili, Total 0.2 - 1.0 MG/DL 0.5 0.4 0.4   Bili, Direct 0.0 - 0.2 MG/DL 0.2 0.2 0.2   Albumin 3.4 - 5.0 g/dL 3.8 3.6 3.5   BUN 7.0 - 18 MG/DL 16 16 24 (H)   Creat 0.6 - 1.3 MG/DL 0.98 0.81 0.75   Na 136 - 145 mmol/L 137 140 139   K 3.5 - 5.5 mmol/L 4.0 4.4 4.3   Cl 100 - 111 mmol/L 102 104 104   CO2 21 - 32 mmol/L 33 (H) 31 33 (H)   Glucose 74 - 99 mg/dL 91 97 91     Cancer Screening Latest Ref Rng & Units 3/18/2021 9/3/2020 3/3/2020   AFP, Serum 0.0 - 8.0 ng/mL 2.2 2.6 2.3   AFP-L3% 0.0 - 9.9 % Comment Comment Comment     Serologies and Other Pertinent Laboratories:  09/10:  HB e antigen negative, HB e antibody positive, HB surface antigen positive, HB surface antibody negative. Liver Biopsy:  2000:  Knodell score 13 (7791). 2002:  Bridging fibrosis with characteristics suggestive of WADSWORTH.     09/2017. TRANSIENT HEPATIC ELASTOGRAPHY: E Range: 6.26-10.63 kPa, E Mean: 7.92 kPa, E Median: 7.45 kPa, E Std: 1.31 kPa    10/2018. TRANSIENT HEPATIC ELASTOGRAPHY:   E Range: 5.44-7.91 kPa  E Mean: 6.63 kPa  E Median: 6.76 kPa  E Std: 0.69 kPa    10/2019. TRANSIENT HEPATIC ELASTOGRAPHY:   E Range: 8.88-13.44 kPa, previous 5.44-7.91 kPA  E Mean: 10.54 kPa, previous 6.63 kPA  E Median: 10.15 kPa, previous 6.76 kPA  E Std: 1.58 kPa, previous 0.69 kPA    09/2020. [facility]. EkPa was 11.0. IQR/med 15%. . The results suggested a fibrosis level of F3. The CAP score suggests no evidence of fatty liver. Endoscopic Procedures:  None available. Radiology:  05/2018. Ultrasound of the liver. Echogenic liver parenchyma may be due to steatosis or underlying hepatocellular disease. An area of decreased echotexture adjacent to the gallbladder likely represents a region of fatty sparing similar to the prior study. 10/2018. Ultrasound of the liver. Echogenic liver parenchyma may be due to steatosis or underlying hepatocellular disease. There are areas of fatty sparing without definite mass. 04/2019. Ultrasound of the liver. Echogenic liver, nonspecific but most likely fatty infiltration. There are areas of relative sparing of fatty infiltration. No suspicious lesion. 10/2019. Ultrasound of the liver. Hepatomegaly. Increased hepatic echogenicity, correlating with known hepatocellular dysfunction and hepatitis. No focal hepatic lesion is seen. 06/2020. Ultrasound of the liver. Coarsened echotexture. No focal mass. 12/2020. Ultrasound of the liver. Heterogeneously mild increased hepatic parenchymal echotexture. No evidence of solid mass. Other Testing:  None available. Follow-up at the [facility] in 6 months for fibroscan and continued monitoring.       Dania [place], FNP-C  Liver [place] of [facility]  [address] [facility], 8[address] [facility]   [address] Karen To, 3[address] [facility]   [phone]

## 2021-03-19 LAB
AFP L3 MFR SERPL: NORMAL % (ref 0–9.9)
AFP SERPL-MCNC: 2.2 NG/ML (ref 0–8)
HBV DNA # SERPL NAA+PROBE: NORMAL COPIES/ML
HBV DNA SERPL NAA+PROBE-ACNC: NORMAL IU/ML
HBV DNA SERPL NAA+PROBE-LOG IU: NORMAL LOG10 IU/ML
HBV DNA SERPL NAA+PROBE-LOG#: NORMAL LOG10COPY/ML
TEST INFORMATION: NORMAL

## 2021-06-01 ENCOUNTER — HOSPITAL ENCOUNTER (OUTPATIENT)
Dept: ULTRASOUND IMAGING | Age: 71
Discharge: HOME | End: 2021-06-01
Payer: MEDICARE

## 2021-06-01 DIAGNOSIS — B19.10 HEP B W/O COMA: ICD-10-CM

## 2021-06-01 PROCEDURE — 76705 ECHO EXAM OF ABDOMEN: CPT

## 2021-06-16 ENCOUNTER — TELEPHONE (OUTPATIENT)
Dept: HEMATOLOGY | Age: 71
End: 2021-06-16

## 2021-06-28 ENCOUNTER — OFFICE VISIT (OUTPATIENT)
Dept: HEMATOLOGY | Age: 71
End: 2021-06-28
Payer: MEDICARE

## 2021-06-28 ENCOUNTER — HOSPITAL ENCOUNTER (OUTPATIENT)
Dept: LAB | Age: 71
Discharge: HOME | End: 2021-06-28
Payer: MEDICARE

## 2021-06-28 VITALS
WEIGHT: 162 LBS | BODY MASS INDEX: 25.43 KG/M2 | RESPIRATION RATE: 16 BRPM | HEIGHT: 67 IN | TEMPERATURE: 97.8 F | OXYGEN SATURATION: 98 % | DIASTOLIC BLOOD PRESSURE: 68 MMHG | SYSTOLIC BLOOD PRESSURE: 122 MMHG | HEART RATE: 90 BPM

## 2021-06-28 DIAGNOSIS — B19.10 HEP B W/O COMA: ICD-10-CM

## 2021-06-28 DIAGNOSIS — B19.10 HEP B W/O COMA: Primary | ICD-10-CM

## 2021-06-28 LAB
ALBUMIN SERPL-MCNC: 3.6 G/DL (ref 3.4–5)
ALBUMIN/GLOB SERPL: 1.2 {RATIO} (ref 0.8–1.7)
ALP SERPL-CCNC: 142 U/L (ref 45–117)
ALT SERPL-CCNC: 27 U/L (ref 16–61)
ANION GAP SERPL CALC-SCNC: 6 MMOL/L (ref 3–18)
AST SERPL-CCNC: 8 U/L (ref 10–38)
BASOPHILS # BLD: 0 K/UL (ref 0–0.1)
BASOPHILS NFR BLD: 1 % (ref 0–2)
BILIRUB DIRECT SERPL-MCNC: 0.1 MG/DL (ref 0–0.2)
BILIRUB SERPL-MCNC: 0.5 MG/DL (ref 0.2–1)
BUN SERPL-MCNC: 19 MG/DL (ref 7–18)
BUN/CREAT SERPL: 23 (ref 12–20)
CALCIUM SERPL-MCNC: 8.7 MG/DL (ref 8.5–10.1)
CHLORIDE SERPL-SCNC: 107 MMOL/L (ref 100–111)
CO2 SERPL-SCNC: 29 MMOL/L (ref 21–32)
CREAT SERPL-MCNC: 0.81 MG/DL (ref 0.6–1.3)
DIFFERENTIAL METHOD BLD: ABNORMAL
EOSINOPHIL # BLD: 0.3 K/UL (ref 0–0.4)
EOSINOPHIL NFR BLD: 7 % (ref 0–5)
ERYTHROCYTE [DISTWIDTH] IN BLOOD BY AUTOMATED COUNT: 13 % (ref 11.6–14.5)
GLOBULIN SER CALC-MCNC: 3.1 G/DL (ref 2–4)
GLUCOSE SERPL-MCNC: 94 MG/DL (ref 74–99)
HCT VFR BLD AUTO: 39.5 % (ref 36–48)
HGB BLD-MCNC: 13.2 G/DL (ref 13–16)
INR PPP: 0.9 (ref 0.8–1.2)
LYMPHOCYTES # BLD: 1.1 K/UL (ref 0.9–3.6)
LYMPHOCYTES NFR BLD: 29 % (ref 21–52)
MCH RBC QN AUTO: 33.8 PG (ref 24–34)
MCHC RBC AUTO-ENTMCNC: 33.4 G/DL (ref 31–37)
MCV RBC AUTO: 101.3 FL (ref 74–97)
MONOCYTES # BLD: 0.4 K/UL (ref 0.05–1.2)
MONOCYTES NFR BLD: 10 % (ref 3–10)
NEUTS SEG # BLD: 2 K/UL (ref 1.8–8)
NEUTS SEG NFR BLD: 52 % (ref 40–73)
PLATELET # BLD AUTO: 164 K/UL (ref 135–420)
PMV BLD AUTO: 7.8 FL (ref 9.2–11.8)
POTASSIUM SERPL-SCNC: 4.2 MMOL/L (ref 3.5–5.5)
PROT SERPL-MCNC: 6.7 G/DL (ref 6.4–8.2)
PROTHROMBIN TIME: 12.1 SEC (ref 11.5–15.2)
RBC # BLD AUTO: 3.9 M/UL (ref 4.35–5.65)
SODIUM SERPL-SCNC: 142 MMOL/L (ref 136–145)
WBC # BLD AUTO: 3.8 K/UL (ref 4.6–13.2)

## 2021-06-28 PROCEDURE — 3017F COLORECTAL CA SCREEN DOC REV: CPT | Performed by: NURSE PRACTITIONER

## 2021-06-28 PROCEDURE — 87517 HEPATITIS B DNA QUANT: CPT

## 2021-06-28 PROCEDURE — G8754 DIAS BP LESS 90: HCPCS | Performed by: NURSE PRACTITIONER

## 2021-06-28 PROCEDURE — 82107 ALPHA-FETOPROTEIN L3: CPT

## 2021-06-28 PROCEDURE — 85025 COMPLETE CBC W/AUTO DIFF WBC: CPT

## 2021-06-28 PROCEDURE — 36415 COLL VENOUS BLD VENIPUNCTURE: CPT

## 2021-06-28 PROCEDURE — G8427 DOCREV CUR MEDS BY ELIG CLIN: HCPCS | Performed by: NURSE PRACTITIONER

## 2021-06-28 PROCEDURE — G8752 SYS BP LESS 140: HCPCS | Performed by: NURSE PRACTITIONER

## 2021-06-28 PROCEDURE — 85610 PROTHROMBIN TIME: CPT

## 2021-06-28 PROCEDURE — G8419 CALC BMI OUT NRM PARAM NOF/U: HCPCS | Performed by: NURSE PRACTITIONER

## 2021-06-28 PROCEDURE — G0463 HOSPITAL OUTPT CLINIC VISIT: HCPCS | Performed by: NURSE PRACTITIONER

## 2021-06-28 PROCEDURE — G8432 DEP SCR NOT DOC, RNG: HCPCS | Performed by: NURSE PRACTITIONER

## 2021-06-28 PROCEDURE — G8536 NO DOC ELDER MAL SCRN: HCPCS | Performed by: NURSE PRACTITIONER

## 2021-06-28 PROCEDURE — 99214 OFFICE O/P EST MOD 30 MIN: CPT | Performed by: NURSE PRACTITIONER

## 2021-06-28 PROCEDURE — 1101F PT FALLS ASSESS-DOCD LE1/YR: CPT | Performed by: NURSE PRACTITIONER

## 2021-06-28 PROCEDURE — 80048 BASIC METABOLIC PNL TOTAL CA: CPT

## 2021-06-28 PROCEDURE — 80076 HEPATIC FUNCTION PANEL: CPT

## 2021-06-28 RX ORDER — LEVOTHYROXINE SODIUM 125 UG/1
125 TABLET ORAL
COMMUNITY

## 2021-06-28 NOTE — PROGRESS NOTES
Maria De Jesus Terrell MD, [place], Gordy Booth, MD Andrae Isabel, PAKALIN Coffman, [facility]     Haily Maya [facility]   Sonja White, MARIO Royal, [facility]       [facility] 136    at [address], [facility] Ovi Prakash  22.    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 The active problem list, all pertinent past medical history, medications, liver histology, endoscopic studies, radiologic findings and laboratory findings related to the liver disorder were reviewed with the patient. Most recent imaging was performed with ultrasound in 12/2020. Heterogeneously mild increased hepatic parenchymal echotexture. No evidence of solid mass. Shear wave elastography was performed with ultrasound in 09/2017. This suggests portal fibrosis with a Metavir score of F1. The elastography was repeated in 01/2018 and the suggested Metavir fibrosis score remains F1. Elastography from 10/2019 suggested a Metavir fibrosis score of F3. Fibroscan was performed in the office in 09/2020. Results of the scan indicate F2/F3. There is no evidence of fatty liver. The most recent laboratory studies indicate that the liver transaminases are normal, alkaline phosphatase is normal, tests of hepatic synthetic and metabolic function are normal, and the platelet count is normal.        HBV is being treated with Viread. HBV DNA in 03/2021 was undetectable.      The patient has no complaints which can be attributed to liver disease. The patient completes all daily activities without any functional limitations. The patient has not experienced fatigue, fevers, chills, shortness of breath, chest pain, pain in the right side over the liver, diffuse abdominal pain, nausea, vomiting, constipation, diarrhrea, dry eyes, dry mouth, arthralgias, myalgias, yellowing of the eyes or skin, itching, dark urine, problems concentrating, swelling of the abdomen, swelling of the lower extremities, hematemesis, or hematochezia. Since the last office appointment:  Patient reports that his Matthew Silva is getting worse\" since 2019. Patient presents today with concern over the wording in his ultrasound report. The report reads \"cirrhosis\". Labs do NOT reflect cirrhosis. Recent fibroscan does not indicate cirrhosis. The patient does not have cirrhosis. 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 The most recent laboratory studies indicate that the liver transaminases are normal, alkaline phosphatase is elevated, tests of hepatic synthetic and metabolic function are normal, and the platelet count is normal.  Will perform laboratory testing to monitor liver function and degree of liver injury. This will include hepatic panel, a CBC w/ diff, a BMP, an AFP-L3%,  and an HBV PCR.      HBV. HBV is being treated with Viread. HBV DNA in 03/2021 was undetectable. [facility] [address] screening. I explained that he should [address] screening with ultrasound and AFP-L3% every 6 months henceforth. The need to perform an assessment of liver fibrosis was discussed with the patient. Fibroscan can assess liver fibrosis and determine if a patient has advanced fibrosis or cirrhosis without the need for liver biopsy. This was performed in the office in 09/2020. Results of the scan demonstrate a Metavir fibrosis score of F2/F3. There is no evidence of fatty liver disease.        Fibroscan can be performed annually or as often as clinically indicated moving forward. Will repeat the fibroscan when the patient comes in for follow up in 3 months. Alcohol counseling provided. The need for vaccination against viral hepatitis A will be assessed with serologic and instituted as appropriate. All of the above issues were discussed with the patient. All questions were answered. The patient expressed a clear understanding of the above. Patient Active Problem List    Diagnosis Date Noted    Chronic hepatitis B. Treated with Pegasys from 1/30/04 - 07/15/04.  Hypothyroidism     HBV. Participated in a clinical trial utilizing emtricitabine and Clevudine, relapse after treatment.  HBV.  started Epivir 08/05 with only a 2 log drop in HBV DNA as of 03/06. Adefovir substituted 06/06     Hypertension     Glaucoma     Hypercholesteremia     Gout      Current Outpatient Medications   Medication Sig Dispense Refill    tenofovir DISOPROXIL FUMARATE (Viread) 300 mg tablet Take 1 Tab by mouth daily. 90 Tab 3    losartan-hydroCHLOROthiazide (HYZAAR) 100-12.5 mg per tablet       amLODIPine (NORVASC) 5 mg tablet       metroNIDAZOLE 1 % glwp       LORATADINE (CLARITIN PO) Take  by mouth.  LEVOTHYROXINE SODIUM (LEVOTHROID PO) Take  by mouth.  NIACIN (NIASPAN EXTENDED-RELEASE PO) Take  by mouth.  OMEGA-3 FATTY ACIDS (FISH OIL CONCENTRATE PO) Take  by mouth.  ALLOPURINOL PO Take  by mouth. No Known Allergies   SYSTEM REVIEW NOT RELATED TO LIVER DISEASE OR REVIEWED ABOVE:  Constitution systems: Negative for fever, chills, weight gain, weight loss. Eyes: Negative for visual changes. ENT: Negative for sore throat, painful swallowing. Respiratory: Negative for cough, hemoptysis, SOB. Cardiology: Negative for chest pain, palpitations. GI:  Negative for constipation or diarrhea. : Negative for urinary frequency, dysuria, hematuria, nocturia. Skin: Negative for rash.   Hematology: Negative for easy bruising, blood clots. Musculo-skelatal: Negative for back pain, muscle pain, weakness. Neurologic: Negative for headaches, dizziness, vertigo, memory problems not related to HE. Psychology: Negative for anxiety, depression. Family/Social History:  He does not drink alcohol and has history of intranasal drug use. He was possibly exposed to hepatitis B sometime in the 1970s. His family is unremarkable for liver disease. He is single with no children. He is a retired . Retired in 2005. Objective:     Visit Vitals  /64 (BP 1 Location: Right arm, BP Patient Position: Sitting, BP Cuff Size: Small adult)   Pulse 81   Resp 16   Ht 5' 8\" (1.727 m)   Wt 160 lb (72.6 kg)   SpO2 99%   BMI 24.33 kg/m²       General appearance: no distress  Eyes: sclera anicteric  ENT: no oral lesions, thyroid normal  Nodes: no adenopathy  Skin: no spider angiomata, jaundice, palmar erythema or xanthalasma  Respiratory: clear to auscultation bilaterally  Cardiovascular: regular heart rate, no murmurs, no JVD, no edema  Abdomen: soft, non-tender, liver size normal to percussion/palpation. Spleen not palpable.  No obvious ascites  Extremities: no muscle wasting, no gross arthritic changes  : not examined  Neurologic: alert and oriented, cranial nerves grossly intact, no asterixis    LABORATORY STUDIES:  Liver [place] of [address] Units 3/18/2021 9/3/2020 3/3/2020   WBC 4.6 - 13.2 K/uL 4.2 (L) 4.5 (L) 3.9 (L)   ANC 1.8 - 8.0 K/UL 2.3 2.4 2.1   HGB 13.0 - 16.0 g/dL 14.1 12.4 (L) 13.2    - 420 K/uL 167 154 152   INR 0.8 - 1.2        AST 10 - 38 U/L 10 11 10   ALT 16 - 61 U/L 23 20 22   Alk Phos 45 - 117 U/L 136 (H) 123 (H) 128 (H)   Bili, Total 0.2 - 1.0 MG/DL 0.5 0.4 0.4   Bili, Direct 0.0 - 0.2 MG/DL 0.2 0.2 0.2   Albumin 3.4 - 5.0 g/dL 3.8 3.6 3.5   BUN 7.0 - 18 MG/DL 16 16 24 (H)   Creat 0.6 - 1.3 MG/DL 0.98 0.81 0.75   Na 136 - 145 mmol/L 137 140 139   K 3.5 - 5.5 mmol/L 4.0 4.4 4.3   Cl 100 - 111 mmol/L 102 104 104   CO2 21 - 32 mmol/L 33 (H) 31 33 (H)   Glucose 74 - 99 mg/dL 91 97 91     Cancer Screening Latest Ref Rng & Units 3/18/2021 9/3/2020 3/3/2020   AFP, Serum 0.0 - 8.0 ng/mL 2.2 2.6 2.3   AFP-L3% 0.0 - 9.9 % Comment Comment Comment     Serologies and Other Pertinent Laboratories:  09/10:  HB e antigen negative, HB e antibody positive, HB surface antigen positive, HB surface antibody negative. Liver Biopsy:  2000:  Knodell score 13 (9405). 2002:  Bridging fibrosis with characteristics suggestive of WADSWORTH.     09/2017. TRANSIENT HEPATIC ELASTOGRAPHY: E Range: 6.26-10.63 kPa, E Mean: 7.92 kPa, E Median: 7.45 kPa, E Std: 1.31 kPa    10/2018. TRANSIENT HEPATIC ELASTOGRAPHY:   E Range: 5.44-7.91 kPa  E Mean: 6.63 kPa  E Median: 6.76 kPa  E Std: 0.69 kPa    10/2019. TRANSIENT HEPATIC ELASTOGRAPHY:   E Range: 8.88-13.44 kPa, previous 5.44-7.91 kPA  E Mean: 10.54 kPa, previous 6.63 kPA  E Median: 10.15 kPa, previous 6.76 kPA  E Std: 1.58 kPa, previous 0.69 kPA    09/2020. FibroScan performed at [address]. EkPa was 11.0. IQR/med 15%. . The results suggested a fibrosis level of F3. The CAP score suggests no evidence of fatty liver. Endoscopic Procedures:  None available. Radiology:  05/2018. Ultrasound of the liver. Echogenic liver parenchyma may be due to steatosis or underlying hepatocellular disease. An area of decreased echotexture adjacent to the gallbladder likely represents a region of fatty sparing similar to the prior study. 10/2018. Ultrasound of the liver. Echogenic liver parenchyma may be due to steatosis or underlying hepatocellular disease. There are areas of fatty sparing without definite mass. 04/2019. Ultrasound of the liver. Echogenic liver, nonspecific but most likely fatty infiltration. There are areas of relative sparing of fatty infiltration. No suspicious lesion. 10/2019. Ultrasound of the liver. Hepatomegaly. Increased hepatic echogenicity, correlating with known hepatocellular dysfunction and hepatitis. No focal hepatic lesion is seen. 06/2020. Ultrasound of the liver. Coarsened echotexture. No focal mass. 12/2020. Ultrasound of the liver. Heterogeneously mild increased hepatic parenchymal echotexture. No evidence of solid mass. 06/2021. Ultrasound of the liver. Cirrhosis. No focal hepatic lesion. Other Testing:  None available. Follow-up at the [facility] in 6 months for fibroscan and continued monitoring.       MARIO Low  Liver [place] [address], [address] [place] [address] Jul[place], [address] [place] [address]   [phone]

## 2021-08-03 DIAGNOSIS — B19.10 HEP B W/O COMA: ICD-10-CM

## 2021-08-03 RX ORDER — TENOFOVIR DISOPROXIL FUMARATE 300 MG/1
TABLET, FILM COATED ORAL
Qty: 90 TABLET | Refills: 3 | Status: SHIPPED | OUTPATIENT
Start: 2021-08-03 | End: 2022-08-16

## 2021-09-21 ENCOUNTER — OFFICE VISIT (OUTPATIENT)
Dept: HEMATOLOGY | Age: 71
End: 2021-09-21
Payer: MEDICARE

## 2021-09-21 ENCOUNTER — HOSPITAL ENCOUNTER (OUTPATIENT)
Dept: LAB | Age: 71
Discharge: HOME | End: 2021-09-21
Payer: MEDICARE

## 2021-09-21 VITALS
BODY MASS INDEX: 25.8 KG/M2 | DIASTOLIC BLOOD PRESSURE: 65 MMHG | SYSTOLIC BLOOD PRESSURE: 123 MMHG | WEIGHT: 164.4 LBS | HEIGHT: 67 IN | HEART RATE: 75 BPM | OXYGEN SATURATION: 99 % | RESPIRATION RATE: 18 BRPM | TEMPERATURE: 97.1 F

## 2021-09-21 DIAGNOSIS — B19.10 HEP B W/O COMA: Primary | ICD-10-CM

## 2021-09-21 DIAGNOSIS — B19.10 HEP B W/O COMA: ICD-10-CM

## 2021-09-21 LAB
ALBUMIN SERPL-MCNC: 3.4 G/DL (ref 3.4–5)
ALBUMIN/GLOB SERPL: 1.1 {RATIO} (ref 0.8–1.7)
ALP SERPL-CCNC: 127 U/L (ref 45–117)
ALT SERPL-CCNC: 19 U/L (ref 16–61)
ANION GAP SERPL CALC-SCNC: 6 MMOL/L (ref 3–18)
AST SERPL-CCNC: 13 U/L (ref 10–38)
BASOPHILS # BLD: 0 K/UL (ref 0–0.1)
BASOPHILS NFR BLD: 1 % (ref 0–2)
BILIRUB DIRECT SERPL-MCNC: 0.1 MG/DL (ref 0–0.2)
BILIRUB SERPL-MCNC: 0.3 MG/DL (ref 0.2–1)
BUN SERPL-MCNC: 23 MG/DL (ref 7–18)
BUN/CREAT SERPL: 24 (ref 12–20)
CALCIUM SERPL-MCNC: 8.8 MG/DL (ref 8.5–10.1)
CHLORIDE SERPL-SCNC: 104 MMOL/L (ref 100–111)
CO2 SERPL-SCNC: 30 MMOL/L (ref 21–32)
CREAT SERPL-MCNC: 0.94 MG/DL (ref 0.6–1.3)
DIFFERENTIAL METHOD BLD: ABNORMAL
EOSINOPHIL # BLD: 0.4 K/UL (ref 0–0.4)
EOSINOPHIL NFR BLD: 10 % (ref 0–5)
ERYTHROCYTE [DISTWIDTH] IN BLOOD BY AUTOMATED COUNT: 13 % (ref 11.6–14.5)
GLOBULIN SER CALC-MCNC: 3.2 G/DL (ref 2–4)
GLUCOSE SERPL-MCNC: 90 MG/DL (ref 74–99)
HCT VFR BLD AUTO: 38.1 % (ref 36–48)
HGB BLD-MCNC: 12.6 G/DL (ref 13–16)
LYMPHOCYTES # BLD: 1.2 K/UL (ref 0.9–3.6)
LYMPHOCYTES NFR BLD: 31 % (ref 21–52)
MCH RBC QN AUTO: 32.1 PG (ref 24–34)
MCHC RBC AUTO-ENTMCNC: 33.1 G/DL (ref 31–37)
MCV RBC AUTO: 96.9 FL (ref 78–100)
MONOCYTES # BLD: 0.4 K/UL (ref 0.05–1.2)
MONOCYTES NFR BLD: 10 % (ref 3–10)
NEUTS SEG # BLD: 1.9 K/UL (ref 1.8–8)
NEUTS SEG NFR BLD: 48 % (ref 40–73)
PLATELET # BLD AUTO: 182 K/UL (ref 135–420)
PMV BLD AUTO: 8.2 FL (ref 9.2–11.8)
POTASSIUM SERPL-SCNC: 4.4 MMOL/L (ref 3.5–5.5)
PROT SERPL-MCNC: 6.6 G/DL (ref 6.4–8.2)
RBC # BLD AUTO: 3.93 M/UL (ref 4.35–5.65)
SODIUM SERPL-SCNC: 140 MMOL/L (ref 136–145)
WBC # BLD AUTO: 3.9 K/UL (ref 4.6–13.2)

## 2021-09-21 PROCEDURE — 3017F COLORECTAL CA SCREEN DOC REV: CPT | Performed by: NURSE PRACTITIONER

## 2021-09-21 PROCEDURE — 87517 HEPATITIS B DNA QUANT: CPT

## 2021-09-21 PROCEDURE — 91200 LIVER ELASTOGRAPHY: CPT | Performed by: NURSE PRACTITIONER

## 2021-09-21 PROCEDURE — 82107 ALPHA-FETOPROTEIN L3: CPT

## 2021-09-21 PROCEDURE — 80076 HEPATIC FUNCTION PANEL: CPT

## 2021-09-21 PROCEDURE — G0463 HOSPITAL OUTPT CLINIC VISIT: HCPCS | Performed by: NURSE PRACTITIONER

## 2021-09-21 PROCEDURE — G8427 DOCREV CUR MEDS BY ELIG CLIN: HCPCS | Performed by: NURSE PRACTITIONER

## 2021-09-21 PROCEDURE — G8752 SYS BP LESS 140: HCPCS | Performed by: NURSE PRACTITIONER

## 2021-09-21 PROCEDURE — G8419 CALC BMI OUT NRM PARAM NOF/U: HCPCS | Performed by: NURSE PRACTITIONER

## 2021-09-21 PROCEDURE — G8536 NO DOC ELDER MAL SCRN: HCPCS | Performed by: NURSE PRACTITIONER

## 2021-09-21 PROCEDURE — 80048 BASIC METABOLIC PNL TOTAL CA: CPT

## 2021-09-21 PROCEDURE — 1101F PT FALLS ASSESS-DOCD LE1/YR: CPT | Performed by: NURSE PRACTITIONER

## 2021-09-21 PROCEDURE — G8432 DEP SCR NOT DOC, RNG: HCPCS | Performed by: NURSE PRACTITIONER

## 2021-09-21 PROCEDURE — G8754 DIAS BP LESS 90: HCPCS | Performed by: NURSE PRACTITIONER

## 2021-09-21 PROCEDURE — 36415 COLL VENOUS BLD VENIPUNCTURE: CPT

## 2021-09-21 PROCEDURE — 85025 COMPLETE CBC W/AUTO DIFF WBC: CPT

## 2021-09-21 PROCEDURE — 99214 OFFICE O/P EST MOD 30 MIN: CPT | Performed by: NURSE PRACTITIONER

## 2021-09-21 NOTE — LETTER
9/21/2021 10:00 AM    Patient:  Sri Calle   YOB: 1950  Date of Visit: 9/21/2021      Dear   No Recipients: Thank you for referring Mr. Raman Calle to me for evaluation/treatment. Below are the relevant portions of my assessment and plan of care. If you have questions, please do not hesitate to call me. I look forward to following Mr. Mckinley Malone along with you.         Sincerely,      Jovita Ross NP

## 2021-09-21 NOTE — PROGRESS NOTES
3340 [facility] [place], MD, [address], Cite Daisy Dyson MD Camillia Gladden, ZOHREH Nelson [place], [facility]     Haily Maya, [facility]   Lazaro Carmen, [facility]    Davy Larson, [facility]       [facility]ve[facility] Karri De Lauren 136    at [address], [address] [place] [address]    1400 [facility] 22.    [phone]    FAX: [address] [place] [address] Avenue    [address], [address]    [phone]    FAX: [phone]        SUBJECTIVE:  Apoorva Rothman returns to the [place] [address] of [facility] for fibroscan assessment of hepatic fibrosis and for monitoring of chronic HBV. The active problem list, all pertinent past medical history, medications, liver histology, endoscopic studies, radiologic findings and laboratory findings related to the liver disorder were reviewed with the patient. Most recent imaging was performed with ultrasound in 06/2021. Heterogeneously mild increased hepatic parenchymal echotexture. No evidence of solid mass. Shear wave elastography was performed with ultrasound in 09/2017. This suggests portal fibrosis with a Metavir score of F1. The elastography was repeated in 01/2018 and the suggested Metavir fibrosis score remains F1. Elastography from 10/2019 suggested a Metavir fibrosis score of F3. Fibroscan was performed in the office in 09/2021. Results of the scan indicate F2/F3. There is no evidence of fatty liver. The most recent laboratory studies indicate that the liver transaminases are normal, alkaline phosphatase is normal, tests of hepatic synthetic and metabolic function are normal, and the platelet count is normal.        HBV is being treated with Viread. HBV DNA in 03/2021 was undetectable.      The patient has no complaints which can be attributed to liver disease. The patient completes all daily activities without any functional limitations. The patient has not experienced fatigue, fevers, chills, shortness of breath, chest pain, pain in the right side over the liver, diffuse abdominal pain, nausea, vomiting, constipation, diarrhrea, dry eyes, dry mouth, arthralgias, myalgias, yellowing of the eyes or skin, itching, dark urine, problems concentrating, swelling of the abdomen, swelling of the lower extremities, hematemesis, or hematochezia. Since the last office appointment:  Had ultrasound completed. 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 The most recent laboratory studies indicate that the liver transaminases are normal, alkaline phosphatase is elevated, tests of hepatic synthetic and metabolic function are normal, and the platelet count is normal.  Will perform laboratory testing to monitor liver function and degree of liver injury. This will include hepatic panel, a CBC w/ diff, a BMP, an AFP-L3%,  and an HBV PCR.      HBV. HBV is being treated with Viread. HBV DNA in 06/2021 was undetectable. [facility] [address] screening. I explained that he should [facility] [address] screening with ultrasound and AFP-L3% every 6 months henceforth. The need to perform an assessment of liver fibrosis was discussed with the patient. Fibroscan can assess liver fibrosis and determine if a patient has advanced fibrosis or cirrhosis without the need for liver biopsy. This was performed in the office in 09/2020. Results of the scan demonstrate a Metavir fibrosis score of F2/F3. There is no evidence of fatty liver disease. Fibroscan can be performed annually or as often as clinically indicated moving forward. Alcohol counseling provided. The need for vaccination against viral hepatitis A will be assessed with serologic and instituted as appropriate. All of the above issues were discussed with the patient.   All questions were answered. The patient expressed a clear understanding of the above. Patient Active Problem List    Diagnosis Date Noted    Chronic hepatitis B. Treated with Pegasys from 1/30/04 - 07/15/04.  Hypothyroidism     HBV. Participated in a clinical trial utilizing emtricitabine and Clevudine, relapse after treatment.  HBV.  started Epivir 08/05 with only a 2 log drop in HBV DNA as of 03/06. Adefovir substituted 06/06     Hypertension     Glaucoma     Hypercholesteremia     Gout      Current Outpatient Medications   Medication Sig Dispense Refill    tenofovir DISOPROXIL FUMARATE (Viread) 300 mg tablet Take 1 Tab by mouth daily. 90 Tab 3    losartan-hydroCHLOROthiazide (HYZAAR) 100-12.5 mg per tablet       amLODIPine (NORVASC) 5 mg tablet       metroNIDAZOLE 1 % glwp       LORATADINE (CLARITIN PO) Take  by mouth.  LEVOTHYROXINE SODIUM (LEVOTHROID PO) Take  by mouth.  NIACIN (NIASPAN EXTENDED-RELEASE PO) Take  by mouth.  OMEGA-3 FATTY ACIDS (FISH OIL CONCENTRATE PO) Take  by mouth.  ALLOPURINOL PO Take  by mouth. No Known Allergies     SYSTEM REVIEW NOT RELATED TO LIVER DISEASE OR REVIEWED ABOVE:  Constitution systems: Negative for fever, chills, weight gain, weight loss. Eyes: Negative for visual changes. ENT: Negative for sore throat, painful swallowing. Respiratory: Negative for cough, hemoptysis, SOB. Cardiology: Negative for chest pain, palpitations. GI:  Negative for constipation or diarrhea. : Negative for urinary frequency, dysuria, hematuria, nocturia. Skin: Negative for rash. Hematology: Negative for easy bruising, blood clots. Musculo-skelatal: Negative for back pain, muscle pain, weakness. Neurologic: Negative for headaches, dizziness, vertigo, memory problems not related to HE. Psychology: Negative for anxiety, depression. Family/Social History:  He does not drink alcohol and has history of intranasal drug use.   He was possibly exposed to hepatitis B sometime in the 1970s. His family is unremarkable for liver disease. He is single with no children. He is a retired . Retired in 2005. Objective:     Visit Vitals  /64 (BP 1 Location: Right arm, BP Patient Position: Sitting, BP Cuff Size: Small adult)   Pulse 81   Resp 16   Ht 5' 8\" (1.727 m)   Wt 160 lb (72.6 kg)   SpO2 99%   BMI 24.33 kg/m²       General appearance: no distress  Eyes: sclera anicteric  ENT: no oral lesions, thyroid normal  Nodes: no adenopathy  Skin: no spider angiomata, jaundice, palmar erythema or xanthalasma  Respiratory: clear to auscultation bilaterally  Cardiovascular: regular heart rate, no murmurs, no JVD, no edema  Abdomen: soft, non-tender, liver size normal to percussion/palpation. Spleen not palpable.  No obvious ascites  Extremities: no muscle wasting, no gross arthritic changes  : not examined  Neurologic: alert and oriented, cranial nerves grossly intact, no asterixis    LABORATORY STUDIES:  Liver [place] of [address] Units 3/18/2021 9/3/2020 3/3/2020   WBC 4.6 - 13.2 K/uL 4.2 (L) 4.5 (L) 3.9 (L)   ANC 1.8 - 8.0 K/UL 2.3 2.4 2.1   HGB 13.0 - 16.0 g/dL 14.1 12.4 (L) 13.2    - 420 K/uL 167 154 152   INR 0.8 - 1.2        AST 10 - 38 U/L 10 11 10   ALT 16 - 61 U/L 23 20 22   Alk Phos 45 - 117 U/L 136 (H) 123 (H) 128 (H)   Bili, Total 0.2 - 1.0 MG/DL 0.5 0.4 0.4   Bili, Direct 0.0 - 0.2 MG/DL 0.2 0.2 0.2   Albumin 3.4 - 5.0 g/dL 3.8 3.6 3.5   BUN 7.0 - 18 MG/DL 16 16 24 (H)   Creat 0.6 - 1.3 MG/DL 0.98 0.81 0.75   Na 136 - 145 mmol/L 137 140 139   K 3.5 - 5.5 mmol/L 4.0 4.4 4.3   Cl 100 - 111 mmol/L 102 104 104   CO2 21 - 32 mmol/L 33 (H) 31 33 (H)   Glucose 74 - 99 mg/dL 91 97 91     Cancer Screening Latest Ref Rng & Units 3/18/2021 9/3/2020 3/3/2020   AFP, Serum 0.0 - 8.0 ng/mL 2.2 2.6 2.3   AFP-L3% 0.0 - 9.9 % Comment Comment Comment     Serologies and Other Pertinent Laboratories:  09/10:  HB e antigen negative, HB e antibody positive, HB surface antigen positive, HB surface antibody negative. Liver Biopsy:  2000:  Knodell score 13 (9947). 2002:  Bridging fibrosis with characteristics suggestive of WADSWORTH.     09/2017. TRANSIENT HEPATIC ELASTOGRAPHY: E Range: 6.26-10.63 kPa, E Mean: 7.92 kPa, E Median: 7.45 kPa, E Std: 1.31 kPa    10/2018. TRANSIENT HEPATIC ELASTOGRAPHY:   E Range: 5.44-7.91 kPa  E Mean: 6.63 kPa  E Median: 6.76 kPa  E Std: 0.69 kPa    10/2019. TRANSIENT HEPATIC ELASTOGRAPHY:   E Range: 8.88-13.44 kPa, previous 5.44-7.91 kPA  E Mean: 10.54 kPa, previous 6.63 kPA  E Median: 10.15 kPa, previous 6.76 kPA  E Std: 1.58 kPa, previous 0.69 kPA    09/2020. FibroScan performed at [facility]. EkPa was 11.0. IQR/med 15%. . The results suggested a fibrosis level of F3. The CAP score suggests no evidence of fatty liver. Endoscopic Procedures:  None available. Radiology:  05/2018. Ultrasound of the liver. Echogenic liver parenchyma may be due to steatosis or underlying hepatocellular disease. An area of decreased echotexture adjacent to the gallbladder likely represents a region of fatty sparing similar to the prior study. 10/2018. Ultrasound of the liver. Echogenic liver parenchyma may be due to steatosis or underlying hepatocellular disease. There are areas of fatty sparing without definite mass. 04/2019. Ultrasound of the liver. Echogenic liver, nonspecific but most likely fatty infiltration. There are areas of relative sparing of fatty infiltration. No suspicious lesion. 10/2019. Ultrasound of the liver. Hepatomegaly. Increased hepatic echogenicity, correlating with known hepatocellular dysfunction and hepatitis. No focal hepatic lesion is seen. 06/2020. Ultrasound of the liver. Coarsened echotexture. No focal mass. 12/2020. Ultrasound of the liver. Heterogeneously mild increased hepatic parenchymal echotexture.  No evidence of solid mass.   06/2021. Ultrasound of the liver. Cirrhosis. No focal hepatic lesion. Other Testing:  None available. Follow-up at the [facility] in 6 months for continued monitoring.       MARIO Mcginnis  Liver [place] of [facility]  [address], [address]   [address] Karen To, 3100 [facility]   [phone]

## 2021-09-22 LAB
AFP L3 MFR SERPL: NORMAL % (ref 0–9.9)
AFP SERPL-MCNC: 2.6 NG/ML (ref 0–8)
HBV DNA SERPL NAA+PROBE-ACNC: NORMAL IU/ML
HBV DNA SERPL NAA+PROBE-LOG IU: NORMAL LOG10 IU/ML
TEST INFORMATION: NORMAL

## 2021-12-06 ENCOUNTER — HOSPITAL ENCOUNTER (OUTPATIENT)
Dept: ULTRASOUND IMAGING | Age: 71
Discharge: HOME | End: 2021-12-06
Payer: MEDICARE

## 2021-12-06 DIAGNOSIS — B19.10 HEP B W/O COMA: ICD-10-CM

## 2021-12-06 PROCEDURE — 76705 ECHO EXAM OF ABDOMEN: CPT

## 2022-01-26 NOTE — PROGRESS NOTES
3340 [facility], MD, [address], Cite Cynthia Booth, MD Jade Garza, ZOHREH Aquino, [facility]     Haily Maya, [facility]   Kameronrivka Woody, FNP-MITA Youngcynthiaalex Napier, [facility]       [facility]alex [facility] 136    at [address], [address] Ovi Prakash  22.    [phone]    FAX: [address] [place] [address], [address]    [phone]    FAX: [phone]            Subjective:     Alfredo Gomez [facility] for fibroscan assessment of hepatic fibrosis and for monitoring of chronic HBV in the setting of bridging fibrosis. The active problem list, all pertinent past medical history, medications, liver histology, endoscopic studies, radiologic findings and laboratory findings related to the liver disorder were reviewed with the patient. Most recent imaging was performed with ultrasound in 06/2020. Echogenic and slightly coarsened echotexture to the liver which can reflect steatosis and/or chronic liver disease/cirrhosis. Poorly defined area of decreased echogenicity adjacent to gallbladder fossa, most suggestive of focal sparing. This has been previously seen. Shear wave elastography was performed with ultrasound in 09/2017. This suggests portal fibrosis with a Metavir score of F1. The elastography was repeated in 01/2018 and the suggested Metavir fibrosis score remains F1. Elastography from 10/2019 suggested a Metavir fibrosis score of F3.       The most recent laboratory studies indicate that the liver transaminases are normal, alkaline phosphatase is normal, tests of hepatic synthetic and metabolic function are normal, and the platelet count is normal.        HBV is being treated with Viread. HBV DNA in 09/2019 was undetectable. The patient has no complaints which can be attributed to liver disease. The patient completes all daily activities without any functional limitations. The patient has not experienced fatigue, fevers, chills, shortness of breath, chest pain, pain in the right side over the liver, diffuse abdominal pain, nausea, vomiting, constipation, diarrhrea, dry eyes, dry mouth, arthralgias, myalgias, yellowing of the eyes or skin, itching, dark urine, problems concentrating, swelling of the abdomen, swelling of the lower extremities, hematemesis, or hematochezia. Patient Active Problem List    Diagnosis Date Noted    Chronic hepatitis B. Treated with Pegasys from 1/30/04 - 07/15/04.  Hypothyroidism     HBV. Participated in a clinical trial utilizing emtricitabine and Clevudine, relapse after treatment.  HBV.  started Epivir 08/05 with only a 2 log drop in HBV DNA as of 03/06. Adefovir substituted 06/06     Hypertension     Glaucoma     Hypercholesteremia     Gout      Current Outpatient Medications   Medication Sig Dispense Refill    tenofovir DISOPROXIL FUMARATE (VIREAD) 300 mg tablet Take 1 Tab by mouth daily. 90 Tab 3    losartan-hydroCHLOROthiazide (HYZAAR) 100-12.5 mg per tablet       amLODIPine (NORVASC) 5 mg tablet       metroNIDAZOLE 1 % glwp       LORATADINE (CLARITIN PO) Take  by mouth.  LEVOTHYROXINE SODIUM (LEVOTHROID PO) Take  by mouth.  NIACIN (NIASPAN EXTENDED-RELEASE PO) Take  by mouth.  OMEGA-3 FATTY ACIDS (FISH OIL CONCENTRATE PO) Take  by mouth.  ALLOPURINOL PO Take  by mouth. No Known Allergies   SYSTEM REVIEW NOT RELATED TO LIVER DISEASE OR REVIEWED ABOVE:  Constitution systems: Negative for fever, chills, weight gain, weight loss. Eyes: Negative for visual changes. ENT: Negative for sore throat, painful swallowing. Respiratory: Negative for cough, hemoptysis, SOB. Cardiology: Negative for chest pain, palpitations. GI:  Negative for constipation or diarrhea. : Negative for urinary frequency, dysuria, hematuria, nocturia. Skin: Negative for rash. Hematology: Negative for easy bruising, blood clots. Musculo-skelatal: Negative for back pain, muscle pain, weakness. Neurologic: Negative for headaches, dizziness, vertigo, memory problems not related to HE. Psychology: Negative for anxiety, depression. Family/Social History:  He does not drink alcohol and has history of intranasal drug use. He was possibly exposed to hepatitis B sometime in the 1970s. His family is unremarkable for liver disease. He is single with no children. He is a retired . Retired in 2005. Objective:     Visit Vitals  /66   Pulse 82   Temp 97.1 °F (36.2 °C) (Tympanic)   Wt 160 lb 2 oz (72.6 kg)   SpO2 96%   BMI 24.35 kg/m²       General appearance: no distress  Eyes: sclera anicteric  ENT: no oral lesions, thyroid normal  Nodes: no adenopathy  Skin: no spider angiomata, jaundice, palmar erythema or xanthalasma  Respiratory: clear to auscultation bilaterally  Cardiovascular: regular heart rate, no murmurs, no JVD, no edema  Abdomen: soft, non-tender, liver size normal to percussion/palpation. Spleen not palpable.  No obvious ascites  Extremities: no muscle wasting, no gross arthritic changes  : not examined  Neurologic: alert and oriented, cranial nerves grossly intact, no asterixis    LABORATORY STUDIES:  Liver [place] of [address] Units 9/3/2020 3/3/2020 9/19/2019   WBC 4.6 - 13.2 K/uL 4.5 (L) 3.9 (L) 3.4 (L)   ANC 1.8 - 8.0 K/UL 2.4 2.1 1.7 (L)   HGB 13.0 - 16.0 g/dL 12.4 (L) 13.2 13.4    - 420 K/uL 154 152 141   INR 0.8 - 1.2        AST 10 - 38 U/L 11 10 8 (L)   ALT 16 - 61 U/L 20 22 18   Alk Phos 45 - 117 U/L 123 (H) 128 (H) 110   Bili, Total 0.2 - 1.0 MG/DL 0.4 0.4 0.4   Bili, Direct 0.0 - 0.2 MG/DL 0.2 0.2 0.1   Albumin 3.4 - 5.0 g/dL 3.6 3.5 3.6   BUN 7.0 - 18 MG/DL 16 24 (H) 19 (H)   Creat 0.6 - 1.3 MG/DL 0.81 0.75 0.91   Na 136 - 145 mmol/L 140 139 138   K 3.5 - 5.5 mmol/L 4.4 4.3 4.5   Cl 100 - 111 mmol/L 104 104 104   CO2 21 - 32 mmol/L 31 33 (H) 31   Glucose 74 - 99 mg/dL 97 91 79     Cancer Screening Latest Ref Rng & Units 9/3/2020 3/3/2020 9/19/2019   AFP, Serum 0.0 - 8.0 ng/mL 2.6 2.3 2.1   AFP-L3% 0.0 - 9.9 % Comment Comment Comment     Serologies and Other Pertinent Laboratories:  09/10:  HB e antigen negative, HB e antibody positive, HB surface antigen positive, HB surface antibody negative. Liver Biopsy:  2000:  Knodell score 13 (9453). 2002:  Bridging fibrosis with characteristics suggestive of WADSWORTH.     09/2017. TRANSIENT HEPATIC ELASTOGRAPHY: E Range: 6.26-10.63 kPa, E Mean: 7.92 kPa, E Median: 7.45 kPa, E Std: 1.31 kPa    10/2018. TRANSIENT HEPATIC ELASTOGRAPHY:   E Range: 5.44-7.91 kPa  E Mean: 6.63 kPa  E Median: 6.76 kPa  E Std: 0.69 kPa    10/2019. TRANSIENT HEPATIC ELASTOGRAPHY:   E Range: 8.88-13.44 kPa, previous 5.44-7.91 kPA  E Mean: 10.54 kPa, previous 6.63 kPA  E Median: 10.15 kPa, previous 6.76 kPA  E Std: 1.58 kPa, previous 0.69 kPA    09/2020. FibroScan performed at [facility] [place]. EkPa was 11.0. IQR/med 15%. . The results suggested a fibrosis level of F3. The CAP score suggests no evidence of fatty liver. Endoscopic Procedures:  None available. Radiology:  01/10:  Abdominal ultrasound - liver is diffusely echogenic consistent with fatty infiltration. No focal hepatic abnormalities. 01/12:  Abdominal ultrasound - liver is diffusely echogenic consistent with fatty infiltration. No focal hepatic abnormalities. 01/13:  Abdominal ultrasound - liver is diffusely echogenic consistent with fatty infiltration. No focal hepatic abnormalities. 01/2014: Abdominal ultrasound - liver is diffusely echogenic consistent with fatty infiltration. No focal hepatic abnormalities. 01/2015:  Abdominal ultrasound - liver is diffusely echogenic consistent with fatty infiltration. No focal hepatic abnormalities. 10/2015: Abdominal ultrasound - liver is diffusely echogenic consistent with fatty infiltration. No focal hepatic abnormalities. 3/2016:  Abdominal ultrasound. Moderately increased hepatic echogenicity likely related to steatosis and/or underlying chronic liver disease. No evidence of a liver mass. 9/2016:  Ultrasound of the abdomen. There is increased echogenicity of the liver, similar to prior study. There is a new hypoechoic area within the right lobe the liver near the gallbladder fossa measuring 1.3 x 1.3 x 0.5 cm. Normal direction of blood flow in the portal vein. The liver measures 18.5 cm in length. 10/2016:  MRI of the liver. Hepatic steatosis. Small area of focal fatty sparing adjacent to gallbladder  fossa, very likely accounts for the finding seen on ultrasound. 2. 3 tiny hepatic lesions are too small to definitively characterize, though have features suggestive of benign hemangiomas. 04/2017. Ultrasound of the liver. Diffuse hepatic echogenicity suggesting either hepatic steatosis or other chronic hepatocellular disease. 09/2017. Ultrasound of the liver, performed with the elastography. Echogenic and slightly coarsened echotexture to the liver which can reflect steatosis and/or chronic liver disease/cirrhosis. Poorly defined area of decreased echogenicity adjacent to gallbladder fossa, most suggestive of focal sparing. 05/2018. Ultrasound of the liver. Echogenic liver parenchyma may be due to steatosis or underlying hepatocellular disease. An area of decreased echotexture adjacent to the gallbladder likely represents a region of fatty sparing similar to the prior study. 10/2018. Ultrasound of the liver. Echogenic liver parenchyma may be due to steatosis or underlying hepatocellular disease. There are areas of fatty sparing without definite mass. [employer] 04/2019. Ultrasound of the liver. Echogenic liver, nonspecific but most likely fatty infiltration. There are areas of relative sparing of fatty infiltration. No suspicious lesion. 10/2019. Ultrasound of the liver. Hepatomegaly. Increased hepatic echogenicity, correlating with known hepatocellular dysfunction and hepatitis. No focal hepatic lesion is seen. 06/2020. Ultrasound of the liver. Coarsened echotexture. No focal mass. Other Testing:  None available. 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 The most recent laboratory studies indicate that the liver transaminases are normal, alkaline phosphatase is elevated, tests of hepatic synthetic and metabolic function are normal, and the platelet count is normal.  Will perform laboratory testing to monitor liver function and degree of liver injury. This will include hepatic panel, a CBC w/ diff, a BMP, an AFP-L3%,  and an HBV PCR.      HBV. HBV is being treated with Viread. HBV DNA in 09/2020 was undetectable. [address] screening. I explained that he should [address] screening with ultrasound and AFP-L3% every 6 months henceforth. Repeat imaging was ordered today to be performed in 12/2020. The need to perform an assessment of liver fibrosis was discussed with the patient. Fibroscan can assess liver fibrosis and determine if a patient has advanced fibrosis or cirrhosis without the need for liver biopsy. This was performed in the office today. Results of the scan demonstrate a Metavir fibrosis score of F3. Elastography can be performed annually or as often as clinically indicated moving forward. Alcohol counseling provided. The need for vaccination against viral hepatitis A will be assessed with serologic and instituted as appropriate. All of the above issues were discussed with the patient. All questions were answered. The patient expressed a clear understanding of the above.     Follow-up at the [facility] in 6 months.       MARIO Mayes  Liver [facility]  4 [facility], 8[address] [facility], [address] [place] [address]   [phone]

## 2022-03-21 ENCOUNTER — HOSPITAL ENCOUNTER (OUTPATIENT)
Dept: LAB | Age: 72
Discharge: HOME | End: 2022-03-21
Payer: MEDICARE

## 2022-03-21 ENCOUNTER — OFFICE VISIT (OUTPATIENT)
Dept: HEMATOLOGY | Age: 72
End: 2022-03-21
Payer: MEDICARE

## 2022-03-21 VITALS
TEMPERATURE: 97.3 F | WEIGHT: 163 LBS | RESPIRATION RATE: 19 BRPM | DIASTOLIC BLOOD PRESSURE: 71 MMHG | HEIGHT: 67 IN | OXYGEN SATURATION: 99 % | SYSTOLIC BLOOD PRESSURE: 125 MMHG | HEART RATE: 79 BPM | BODY MASS INDEX: 25.58 KG/M2

## 2022-03-21 DIAGNOSIS — B19.10 HEP B W/O COMA: Primary | ICD-10-CM

## 2022-03-21 DIAGNOSIS — B19.10 HEP B W/O COMA: ICD-10-CM

## 2022-03-21 PROBLEM — L71.9 ROSACEA: Status: ACTIVE | Noted: 2022-03-21

## 2022-03-21 LAB
ALBUMIN SERPL-MCNC: 3.8 G/DL (ref 3.4–5)
ALBUMIN/GLOB SERPL: 1.3 {RATIO} (ref 0.8–1.7)
ALP SERPL-CCNC: 140 U/L (ref 45–117)
ALT SERPL-CCNC: 25 U/L (ref 16–61)
ANION GAP SERPL CALC-SCNC: 2 MMOL/L (ref 3–18)
AST SERPL-CCNC: 17 U/L (ref 10–38)
BASOPHILS # BLD: 0 K/UL (ref 0–0.1)
BASOPHILS NFR BLD: 1 % (ref 0–2)
BILIRUB DIRECT SERPL-MCNC: 0.2 MG/DL (ref 0–0.2)
BILIRUB SERPL-MCNC: 0.5 MG/DL (ref 0.2–1)
BUN SERPL-MCNC: 15 MG/DL (ref 7–18)
BUN/CREAT SERPL: 16 (ref 12–20)
CALCIUM SERPL-MCNC: 9 MG/DL (ref 8.5–10.1)
CHLORIDE SERPL-SCNC: 106 MMOL/L (ref 100–111)
CO2 SERPL-SCNC: 32 MMOL/L (ref 21–32)
CREAT SERPL-MCNC: 0.96 MG/DL (ref 0.6–1.3)
DIFFERENTIAL METHOD BLD: ABNORMAL
EOSINOPHIL # BLD: 0.5 K/UL (ref 0–0.4)
EOSINOPHIL NFR BLD: 11 % (ref 0–5)
ERYTHROCYTE [DISTWIDTH] IN BLOOD BY AUTOMATED COUNT: 13.2 % (ref 11.6–14.5)
GLOBULIN SER CALC-MCNC: 3 G/DL (ref 2–4)
GLUCOSE SERPL-MCNC: 95 MG/DL (ref 74–99)
HCT VFR BLD AUTO: 41 % (ref 36–48)
HGB BLD-MCNC: 13.5 G/DL (ref 13–16)
IMM GRANULOCYTES # BLD AUTO: 0 K/UL (ref 0–0.04)
IMM GRANULOCYTES NFR BLD AUTO: 1 % (ref 0–0.5)
LYMPHOCYTES # BLD: 1.1 K/UL (ref 0.9–3.6)
LYMPHOCYTES NFR BLD: 24 % (ref 21–52)
MCH RBC QN AUTO: 32.5 PG (ref 24–34)
MCHC RBC AUTO-ENTMCNC: 32.9 G/DL (ref 31–37)
MCV RBC AUTO: 98.8 FL (ref 78–100)
MONOCYTES # BLD: 0.4 K/UL (ref 0.05–1.2)
MONOCYTES NFR BLD: 8 % (ref 3–10)
NEUTS SEG # BLD: 2.6 K/UL (ref 1.8–8)
NEUTS SEG NFR BLD: 57 % (ref 40–73)
NRBC # BLD: 0 K/UL (ref 0–0.01)
NRBC BLD-RTO: 0 PER 100 WBC
PLATELET # BLD AUTO: 175 K/UL (ref 135–420)
PMV BLD AUTO: 8.1 FL (ref 9.2–11.8)
POTASSIUM SERPL-SCNC: 4.4 MMOL/L (ref 3.5–5.5)
PROT SERPL-MCNC: 6.8 G/DL (ref 6.4–8.2)
RBC # BLD AUTO: 4.15 M/UL (ref 4.35–5.65)
SODIUM SERPL-SCNC: 140 MMOL/L (ref 136–145)
WBC # BLD AUTO: 4.7 K/UL (ref 4.6–13.2)

## 2022-03-21 PROCEDURE — 80048 BASIC METABOLIC PNL TOTAL CA: CPT

## 2022-03-21 PROCEDURE — 85025 COMPLETE CBC W/AUTO DIFF WBC: CPT

## 2022-03-21 PROCEDURE — 87517 HEPATITIS B DNA QUANT: CPT

## 2022-03-21 PROCEDURE — 36415 COLL VENOUS BLD VENIPUNCTURE: CPT

## 2022-03-21 PROCEDURE — G0463 HOSPITAL OUTPT CLINIC VISIT: HCPCS | Performed by: NURSE PRACTITIONER

## 2022-03-21 PROCEDURE — 99214 OFFICE O/P EST MOD 30 MIN: CPT | Performed by: NURSE PRACTITIONER

## 2022-03-21 PROCEDURE — 80076 HEPATIC FUNCTION PANEL: CPT

## 2022-03-21 PROCEDURE — 82107 ALPHA-FETOPROTEIN L3: CPT

## 2022-03-21 NOTE — PROGRESS NOTES
3340 [facility], MD, [address], [place], [place]      ZOHREH Martinez [facility] Maya, [facility]   MARIO Long, [facility]       Everette Galvin Karri De Lauren 136    at [address], [address] [place] [address], [facility] 22.    [phone]    FAX: [address]    at [address], [address], [address]    [phone]    FAX: [phone]        SUBJECTIVE:  Jensen Hanna returns to the [place] [address] of [facility] for fibroscan assessment of hepatic fibrosis and for monitoring of chronic HBV. The active problem list, all pertinent past medical history, medications, liver histology, endoscopic studies, radiologic findings and laboratory findings related to the liver disorder were reviewed with the patient. Most recent imaging was performed with ultrasound in 06/2021. Heterogeneously mild increased hepatic parenchymal echotexture. No evidence of solid mass. Shear wave elastography was performed with ultrasound in 09/2017. This suggests portal fibrosis with a Metavir score of F1. The elastography was repeated in 01/2018 and the suggested Metavir fibrosis score remains F1. Elastography from 10/2019 suggested a Metavir fibrosis score of F3. Fibroscan was performed in the office in 09/2021. Results of the scan indicate F2/F3. There is no evidence of fatty liver. The most recent laboratory studies indicate that the liver transaminases are normal, alkaline phosphatase is normal, tests of hepatic synthetic and metabolic function are normal, and the platelet count is normal.        HBV is being treated with Viread. HBV DNA in 03/2021 was undetectable.      The patient has no complaints which can be attributed to liver disease. The patient completes all daily activities without any functional limitations. The patient has not experienced fatigue, fevers, chills, shortness of breath, chest pain, pain in the right side over the liver, diffuse abdominal pain, nausea, vomiting, constipation, diarrhrea, dry eyes, dry mouth, arthralgias, myalgias, yellowing of the eyes or skin, itching, dark urine, problems concentrating, swelling of the abdomen, swelling of the lower extremities, hematemesis, or hematochezia. Since the last office appointment:  Had ultrasound completed. 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 The most recent laboratory studies indicate that the liver transaminases are normal, alkaline phosphatase is elevated, tests of hepatic synthetic and metabolic function are normal, and the platelet count is normal.  Will perform laboratory testing to monitor liver function and degree of liver injury. This will include hepatic panel, a CBC w/ diff, a BMP, an AFP-L3%,  and an HBV PCR.      HBV. HBV is being treated with Viread. HBV DNA in 09/2021 was undetectable. [facility] [address] screening. I explained that he should [address] screening with ultrasound and AFP-L3% every 6 months henceforth. The need to perform an assessment of liver fibrosis was discussed with the patient. Fibroscan can assess liver fibrosis and determine if a patient has advanced fibrosis or cirrhosis without the need for liver biopsy. This was performed in the office in 09/2020. Results of the scan demonstrate a Metavir fibrosis score of F2/F3. There is no evidence of fatty liver disease. Fibroscan can be performed annually or as often as clinically indicated moving forward. This will be performed at his next office visit in 6 months. Alcohol counseling provided.      The need for vaccination against viral hepatitis A will be assessed with serologic and instituted as appropriate. All of the above issues were discussed with the patient. All questions were answered. The patient expressed a clear understanding of the above. Patient Active Problem List    Diagnosis Date Noted    Chronic hepatitis B. Treated with Pegasys from 1/30/04 - 07/15/04.  Hypothyroidism     HBV. Participated in a clinical trial utilizing emtricitabine and Clevudine, relapse after treatment.  HBV.  started Epivir 08/05 with only a 2 log drop in HBV DNA as of 03/06. Adefovir substituted 06/06     Hypertension     Glaucoma     Hypercholesteremia     Gout      Current Outpatient Medications   Medication Sig Dispense Refill    tenofovir DISOPROXIL FUMARATE (Viread) 300 mg tablet Take 1 Tab by mouth daily. 90 Tab 3    losartan-hydroCHLOROthiazide (HYZAAR) 100-12.5 mg per tablet       amLODIPine (NORVASC) 5 mg tablet       metroNIDAZOLE 1 % glwp       LORATADINE (CLARITIN PO) Take  by mouth.  LEVOTHYROXINE SODIUM (LEVOTHROID PO) Take  by mouth.  NIACIN (NIASPAN EXTENDED-RELEASE PO) Take  by mouth.  OMEGA-3 FATTY ACIDS (FISH OIL CONCENTRATE PO) Take  by mouth.  ALLOPURINOL PO Take  by mouth. No Known Allergies     SYSTEM REVIEW NOT RELATED TO LIVER DISEASE OR REVIEWED ABOVE:  Constitution systems: Negative for fever, chills, weight gain, weight loss. Eyes: Negative for visual changes. ENT: Negative for sore throat, painful swallowing. Respiratory: Negative for cough, hemoptysis, SOB. Cardiology: Negative for chest pain, palpitations. GI:  Negative for constipation or diarrhea. : Negative for urinary frequency, dysuria, hematuria, nocturia. Skin: Negative for rash. Hematology: Negative for easy bruising, blood clots. Musculo-skelatal: Negative for back pain, muscle pain, weakness. Neurologic: Negative for headaches, dizziness, vertigo, memory problems not related to HE. Psychology: Negative for anxiety, depression.      Family/Social History:  He does not drink alcohol and has history of intranasal drug use. He was possibly exposed to hepatitis B sometime in the 1970s. His family is unremarkable for liver disease. He is single with no children. He is a retired . Retired in 2005. Objective:     Visit Vitals  /64 (BP 1 Location: Right arm, BP Patient Position: Sitting, BP Cuff Size: Small adult)   Pulse 81   Resp 16   Ht 5' 8\" (1.727 m)   Wt 160 lb (72.6 kg)   SpO2 99%   BMI 24.33 kg/m²       General appearance: no distress  Eyes: sclera anicteric  ENT: no oral lesions, thyroid normal  Nodes: no adenopathy  Skin: no spider angiomata, jaundice, palmar erythema or xanthalasma  Respiratory: clear to auscultation bilaterally  Cardiovascular: regular heart rate, no murmurs, no JVD, no edema  Abdomen: soft, non-tender, liver size normal to percussion/palpation. Spleen not palpable.  No obvious ascites  Extremities: no muscle wasting, no gross arthritic changes  : not examined  Neurologic: alert and oriented, cranial nerves grossly intact, no asterixis    LABORATORY STUDIES:  Liver [place] of [address] Units 3/21/2022 9/21/2021   WBC 4.6 - 13.2 K/uL 4.7 3.9 (L)   ANC 1.8 - 8.0 K/UL 2.6 1.9   HGB 13.0 - 16.0 g/dL 13.5 12.6 (L)    - 420 K/uL 175 182   INR 0.8 - 1.2       AST 10 - 38 U/L 17 13   ALT 16 - 61 U/L 25 19   Alk Phos 45 - 117 U/L 140 (H) 127 (H)   Bili, Total 0.2 - 1.0 MG/DL 0.5 0.3   Bili, Direct 0.0 - 0.2 MG/DL 0.2 0.1   Albumin 3.4 - 5.0 g/dL 3.8 3.4   BUN 7.0 - 18 MG/DL 15 23 (H)   Creat 0.6 - 1.3 MG/DL 0.96 0.94   Na 136 - 145 mmol/L 140 140   K 3.5 - 5.5 mmol/L 4.4 4.4   Cl 100 - 111 mmol/L 106 104   CO2 21 - 32 mmol/L 32 30   Glucose 74 - 99 mg/dL 95 90     Cancer Screening Latest Ref Rng & Units 9/21/2021 6/28/2021 3/18/2021   AFP, Serum 0.0 - 8.0 ng/mL 2.6 2.2 2.2   AFP-L3% 0.0 - 9.9 % Comment Comment Comment     Serologies and Other Pertinent Laboratories:  09/10:  HB e antigen negative, HB e antibody positive, HB surface antigen positive, HB surface antibody negative. Liver Biopsy:  2000:  Knodell score 13 (0759). 2002:  Bridging fibrosis with characteristics suggestive of WADSWORTH.     09/2017. TRANSIENT HEPATIC ELASTOGRAPHY: E Range: 6.26-10.63 kPa, E Mean: 7.92 kPa, E Median: 7.45 kPa, E Std: 1.31 kPa    10/2018. TRANSIENT HEPATIC ELASTOGRAPHY:   E Range: 5.44-7.91 kPa  E Mean: 6.63 kPa  E Median: 6.76 kPa  E Std: 0.69 kPa    10/2019. TRANSIENT HEPATIC ELASTOGRAPHY:   E Range: 8.88-13.44 kPa, previous 5.44-7.91 kPA  E Mean: 10.54 kPa, previous 6.63 kPA  E Median: 10.15 kPa, previous 6.76 kPA  E Std: 1.58 kPa, previous 0.69 kPA    09/2020. FibroScan performed at [address]. EkPa was 11.0. IQR/med 15%. . The results suggested a fibrosis level of F3. The CAP score suggests no evidence of fatty liver. Endoscopic Procedures:  None available. Radiology:  05/2018. Ultrasound of the liver. Echogenic liver parenchyma may be due to steatosis or underlying hepatocellular disease. An area of decreased echotexture adjacent to the gallbladder likely represents a region of fatty sparing similar to the prior study. 10/2018. Ultrasound of the liver. Echogenic liver parenchyma may be due to steatosis or underlying hepatocellular disease. There are areas of fatty sparing without definite mass. 04/2019. Ultrasound of the liver. Echogenic liver, nonspecific but most likely fatty infiltration. There are areas of relative sparing of fatty infiltration. No suspicious lesion. 10/2019. Ultrasound of the liver. Hepatomegaly. Increased hepatic echogenicity, correlating with known hepatocellular dysfunction and hepatitis. No focal hepatic lesion is seen. 06/2020. Ultrasound of the liver. Coarsened echotexture. No focal mass. 12/2020. Ultrasound of the liver. Heterogeneously mild increased hepatic parenchymal echotexture. No evidence of solid mass. 06/2021. Ultrasound of the liver. Cirrhosis. No focal hepatic lesion. 12/2021. Ultrasound of the liver. Fibrotic hepatic parenchymal appearance without change. No mass lesion. Other Testing:  None available. Follow-up at the [facility] in 6 months for fibroscan and continued monitoring.       MARIO Forde  Liver [place] of [facility]  [address], 8[address] [facility]   [address], 3100 [facility]   [phone]

## 2022-03-22 LAB
HBV DNA SERPL NAA+PROBE-ACNC: <10 IU/ML
HBV DNA SERPL NAA+PROBE-LOG IU: NORMAL LOG10 IU/ML
TEST INFORMATION: NORMAL

## 2022-03-23 LAB
AFP L3 MFR SERPL: NORMAL % (ref 0–9.9)
AFP SERPL-MCNC: 2.4 NG/ML (ref 0–8.4)

## 2022-03-24 PROBLEM — L71.9 ROSACEA: Status: ACTIVE | Noted: 2022-03-21

## 2022-06-02 ENCOUNTER — HOSPITAL ENCOUNTER (OUTPATIENT)
Dept: ULTRASOUND IMAGING | Age: 72
Discharge: HOME | End: 2022-06-02
Payer: MEDICARE

## 2022-06-02 DIAGNOSIS — B19.10 HEP B W/O COMA: ICD-10-CM

## 2022-06-02 PROCEDURE — 76705 ECHO EXAM OF ABDOMEN: CPT

## 2022-08-16 DIAGNOSIS — B19.10 HEP B W/O COMA: ICD-10-CM

## 2022-08-16 RX ORDER — TENOFOVIR DISOPROXIL FUMARATE 300 MG/1
TABLET, FILM COATED ORAL
Qty: 90 TABLET | Refills: 3 | Status: SHIPPED | OUTPATIENT
Start: 2022-08-16 | End: 2022-10-01 | Stop reason: SDUPTHER

## 2022-09-21 ENCOUNTER — OFFICE VISIT (OUTPATIENT)
Dept: HEMATOLOGY | Age: 72
End: 2022-09-21
Payer: MEDICARE

## 2022-09-21 ENCOUNTER — HOSPITAL ENCOUNTER (OUTPATIENT)
Dept: LAB | Age: 72
Discharge: HOME | End: 2022-09-21
Payer: MEDICARE

## 2022-09-21 VITALS
SYSTOLIC BLOOD PRESSURE: 135 MMHG | DIASTOLIC BLOOD PRESSURE: 68 MMHG | TEMPERATURE: 97.4 F | HEIGHT: 67 IN | WEIGHT: 165 LBS | HEART RATE: 76 BPM | BODY MASS INDEX: 25.9 KG/M2

## 2022-09-21 DIAGNOSIS — B19.10 HEP B W/O COMA: Primary | ICD-10-CM

## 2022-09-21 DIAGNOSIS — B19.10 HEP B W/O COMA: ICD-10-CM

## 2022-09-21 LAB
ALBUMIN SERPL-MCNC: 3.8 G/DL (ref 3.4–5)
ALBUMIN/GLOB SERPL: 1.3 {RATIO} (ref 0.8–1.7)
ALP SERPL-CCNC: 132 U/L (ref 45–117)
ALT SERPL-CCNC: 23 U/L (ref 16–61)
ANION GAP SERPL CALC-SCNC: 2 MMOL/L (ref 3–18)
AST SERPL-CCNC: 12 U/L (ref 10–38)
BASOPHILS # BLD: 0 K/UL (ref 0–0.1)
BASOPHILS NFR BLD: 1 % (ref 0–2)
BILIRUB DIRECT SERPL-MCNC: 0.1 MG/DL (ref 0–0.2)
BILIRUB SERPL-MCNC: 0.3 MG/DL (ref 0.2–1)
BUN SERPL-MCNC: 25 MG/DL (ref 7–18)
BUN/CREAT SERPL: 27 (ref 12–20)
CALCIUM SERPL-MCNC: 9.1 MG/DL (ref 8.5–10.1)
CHLORIDE SERPL-SCNC: 105 MMOL/L (ref 100–111)
CO2 SERPL-SCNC: 31 MMOL/L (ref 21–32)
CREAT SERPL-MCNC: 0.91 MG/DL (ref 0.6–1.3)
DIFFERENTIAL METHOD BLD: ABNORMAL
EOSINOPHIL # BLD: 0.5 K/UL (ref 0–0.4)
EOSINOPHIL NFR BLD: 12 % (ref 0–5)
ERYTHROCYTE [DISTWIDTH] IN BLOOD BY AUTOMATED COUNT: 13 % (ref 11.6–14.5)
GLOBULIN SER CALC-MCNC: 3 G/DL (ref 2–4)
GLUCOSE SERPL-MCNC: 101 MG/DL (ref 74–99)
HCT VFR BLD AUTO: 39.4 % (ref 36–48)
HGB BLD-MCNC: 12.8 G/DL (ref 13–16)
IMM GRANULOCYTES # BLD AUTO: 0 K/UL (ref 0–0.04)
IMM GRANULOCYTES NFR BLD AUTO: 1 % (ref 0–0.5)
LYMPHOCYTES # BLD: 1.2 K/UL (ref 0.9–3.6)
LYMPHOCYTES NFR BLD: 30 % (ref 21–52)
MCH RBC QN AUTO: 32.8 PG (ref 24–34)
MCHC RBC AUTO-ENTMCNC: 32.5 G/DL (ref 31–37)
MCV RBC AUTO: 101 FL (ref 78–100)
MONOCYTES # BLD: 0.3 K/UL (ref 0.05–1.2)
MONOCYTES NFR BLD: 8 % (ref 3–10)
NEUTS SEG # BLD: 1.9 K/UL (ref 1.8–8)
NEUTS SEG NFR BLD: 47 % (ref 40–73)
NRBC # BLD: 0 K/UL (ref 0–0.01)
NRBC BLD-RTO: 0 PER 100 WBC
PLATELET # BLD AUTO: 165 K/UL (ref 135–420)
PMV BLD AUTO: 8.2 FL (ref 9.2–11.8)
POTASSIUM SERPL-SCNC: 4 MMOL/L (ref 3.5–5.5)
PROT SERPL-MCNC: 6.8 G/DL (ref 6.4–8.2)
RBC # BLD AUTO: 3.9 M/UL (ref 4.35–5.65)
SODIUM SERPL-SCNC: 138 MMOL/L (ref 136–145)
WBC # BLD AUTO: 4 K/UL (ref 4.6–13.2)

## 2022-09-21 PROCEDURE — 99214 OFFICE O/P EST MOD 30 MIN: CPT | Performed by: NURSE PRACTITIONER

## 2022-09-21 PROCEDURE — G8752 SYS BP LESS 140: HCPCS | Performed by: NURSE PRACTITIONER

## 2022-09-21 PROCEDURE — G8754 DIAS BP LESS 90: HCPCS | Performed by: NURSE PRACTITIONER

## 2022-09-21 PROCEDURE — G8432 DEP SCR NOT DOC, RNG: HCPCS | Performed by: NURSE PRACTITIONER

## 2022-09-21 PROCEDURE — 85025 COMPLETE CBC W/AUTO DIFF WBC: CPT

## 2022-09-21 PROCEDURE — G8427 DOCREV CUR MEDS BY ELIG CLIN: HCPCS | Performed by: NURSE PRACTITIONER

## 2022-09-21 PROCEDURE — 86692 HEPATITIS DELTA AGENT ANTBDY: CPT

## 2022-09-21 PROCEDURE — G0463 HOSPITAL OUTPT CLINIC VISIT: HCPCS | Performed by: NURSE PRACTITIONER

## 2022-09-21 PROCEDURE — 82107 ALPHA-FETOPROTEIN L3: CPT

## 2022-09-21 PROCEDURE — 36415 COLL VENOUS BLD VENIPUNCTURE: CPT

## 2022-09-21 PROCEDURE — 1123F ACP DISCUSS/DSCN MKR DOCD: CPT | Performed by: NURSE PRACTITIONER

## 2022-09-21 PROCEDURE — G8417 CALC BMI ABV UP PARAM F/U: HCPCS | Performed by: NURSE PRACTITIONER

## 2022-09-21 PROCEDURE — 80076 HEPATIC FUNCTION PANEL: CPT

## 2022-09-21 PROCEDURE — 91200 LIVER ELASTOGRAPHY: CPT | Performed by: NURSE PRACTITIONER

## 2022-09-21 PROCEDURE — 87517 HEPATITIS B DNA QUANT: CPT

## 2022-09-21 PROCEDURE — 80048 BASIC METABOLIC PNL TOTAL CA: CPT

## 2022-09-21 PROCEDURE — G8536 NO DOC ELDER MAL SCRN: HCPCS | Performed by: NURSE PRACTITIONER

## 2022-09-21 PROCEDURE — 3017F COLORECTAL CA SCREEN DOC REV: CPT | Performed by: NURSE PRACTITIONER

## 2022-09-21 PROCEDURE — 1101F PT FALLS ASSESS-DOCD LE1/YR: CPT | Performed by: NURSE PRACTITIONER

## 2022-09-21 NOTE — PROGRESS NOTES
3340 [facility], [place], [place], [facility], [place]      Elsie Eduardo, ZOHREH Parr, [facility]     Haily Maya, [facility]   Daniel Mcneal, MARIO Hendrickson [facility]       [facility] 136    at [place] [address] [address] [facility] [address], [address] [address]rak[address]  22.    [phone]    FAX: [address] [place] [address], [address]    [phone]    FAX: [phone]        SUBJECTIVE:  Viviana Rosario returns to the [place] [address] of [facility] for fibroscan assessment of hepatic fibrosis and for monitoring of chronic HBV. The active problem list, all pertinent past medical history, medications, liver histology, endoscopic studies, radiologic findings and laboratory findings related to the liver disorder were reviewed with the patient. Most recent imaging was performed with ultrasound in 06/2022. Increased hepatic echogenicity. No focal mass. Shear wave elastography was performed with ultrasound in 09/2017. This suggests portal fibrosis with a Metavir score of F1. The elastography was repeated in 01/2018 and the suggested Metavir fibrosis score remains F1. Elastography from 10/2019 suggested a Metavir fibrosis score of F3. Fibroscan was performed in the office in 09/2021. Results of the scan indicate F2/F3. There is no evidence of fatty liver. Today's fibroscan suggests a Metavir fibrosis score of F3. There is no evidence of fatty liver per the scan. The most recent laboratory studies indicate that the liver transaminases are normal, alkaline phosphatase is normal, tests of hepatic synthetic and metabolic function are normal, and the platelet count is normal.        HBV is being treated with Viread.   HBV DNA in 09/2022 was undetectable. The patient has no complaints which can be attributed to liver disease. The patient completes all daily activities without any functional limitations. The patient has not experienced fatigue, fevers, chills, shortness of breath, chest pain, pain in the right side over the liver, diffuse abdominal pain, nausea, vomiting, constipation, diarrhrea, dry eyes, dry mouth, arthralgias, myalgias, yellowing of the eyes or skin, itching, dark urine, problems concentrating, swelling of the abdomen, swelling of the lower extremities, hematemesis, or hematochezia. Since the last office appointment:  Had ultrasound completed. 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 The most recent laboratory studies indicate that the liver transaminases are normal, alkaline phosphatase is elevated, tests of hepatic synthetic and metabolic function are normal, and the platelet count is normal.  Will perform laboratory testing to monitor liver function and degree of liver injury. This will include hepatic panel, a CBC w/ diff, a BMP, an AFP-L3%,  and an HBV PCR.      HBV. HBV is being treated with Viread. HBV DNA in 09/2021 was undetectable. [address] screening. I explained that he should [address] screening with ultrasound and AFP-L3% every 6 months henceforth. The need to perform an assessment of liver fibrosis was discussed with the patient. Fibroscan can assess liver fibrosis and determine if a patient has advanced fibrosis or cirrhosis without the need for liver biopsy. This was performed in the office in 09/2020. Results of the scan demonstrate a Metavir fibrosis score of F2/F3. There is no evidence of fatty liver disease. When the fibroscan was repeated today, the suggested Metavir fibrosis was F3. There is no evidenc of fatty liver per today's scan.         Fibroscan can be performed annually or as often as clinically indicated moving forward. Alcohol counseling provided. The need for vaccination against viral hepatitis A will be assessed with serologic and instituted as appropriate. All of the above issues were discussed with the patient. All questions were answered. The patient expressed a clear understanding of the above. Patient Active Problem List    Diagnosis Date Noted    Chronic hepatitis B. Treated with Pegasys from 1/30/04 - 07/15/04. Hypothyroidism     HBV. Participated in a clinical trial utilizing emtricitabine and Clevudine, relapse after treatment. HBV.  started Epivir 08/05 with only a 2 log drop in HBV DNA as of 03/06. Adefovir substituted 06/06     Hypertension     Glaucoma     Hypercholesteremia     Gout      Current Outpatient Medications   Medication Sig Dispense Refill    tenofovir DISOPROXIL FUMARATE (Viread) 300 mg tablet Take 1 Tab by mouth daily. 90 Tab 3    losartan-hydroCHLOROthiazide (HYZAAR) 100-12.5 mg per tablet       amLODIPine (NORVASC) 5 mg tablet       metroNIDAZOLE 1 % glwp       LORATADINE (CLARITIN PO) Take  by mouth. LEVOTHYROXINE SODIUM (LEVOTHROID PO) Take  by mouth. NIACIN (NIASPAN EXTENDED-RELEASE PO) Take  by mouth. OMEGA-3 FATTY ACIDS (FISH OIL CONCENTRATE PO) Take  by mouth. ALLOPURINOL PO Take  by mouth. No Known Allergies     SYSTEM REVIEW NOT RELATED TO LIVER DISEASE OR REVIEWED ABOVE:  Constitution systems: Negative for fever, chills, weight gain, weight loss. Eyes: Negative for visual changes. ENT: Negative for sore throat, painful swallowing. Respiratory: Negative for cough, hemoptysis, SOB. Cardiology: Negative for chest pain, palpitations. GI:  Negative for constipation or diarrhea. : Negative for urinary frequency, dysuria, hematuria, nocturia. Skin: Negative for rash. Hematology: Negative for easy bruising, blood clots. Musculo-skelatal: Negative for back pain, muscle pain, weakness.   Neurologic: Negative for headaches, dizziness, vertigo, memory problems not related to HE. Psychology: Negative for anxiety, depression. Family/Social History:  He does not drink alcohol and has history of intranasal drug use. He was possibly exposed to hepatitis B sometime in the 1970s. His family is unremarkable for liver disease. He is single with no children. He is a retired . Retired in 2005. Objective:     Visit Vitals  /64 (BP 1 Location: Right arm, BP Patient Position: Sitting, BP Cuff Size: Small adult)   Pulse 81   Resp 16   Ht 5' 8\" (1.727 m)   Wt 160 lb (72.6 kg)   SpO2 99%   BMI 24.33 kg/m²       General appearance: no distress  Eyes: sclera anicteric  ENT: no oral lesions, thyroid normal  Nodes: no adenopathy  Skin: no spider angiomata, jaundice, palmar erythema or xanthalasma  Respiratory: clear to auscultation bilaterally  Cardiovascular: regular heart rate, no murmurs, no JVD, no edema  Abdomen: soft, non-tender, liver size normal to percussion/palpation. Spleen not palpable.  No obvious ascites  Extremities: no muscle wasting, no gross arthritic changes  : not examined  Neurologic: alert and oriented, cranial nerves grossly intact, no asterixis    LABORATORY STUDIES:  Liver [place] of [address] Units 9/21/2022 3/21/2022   WBC 4.6 - 13.2 K/uL 4.0 (L) 4.7   ANC 1.8 - 8.0 K/UL 1.9 2.6   HGB 13.0 - 16.0 g/dL 12.8 (L) 13.5    - 420 K/uL 165 175   INR 0.8 - 1.2       AST 10 - 38 U/L 12 17   ALT 16 - 61 U/L 23 25   Alk Phos 45 - 117 U/L 132 (H) 140 (H)   Bili, Total 0.2 - 1.0 MG/DL 0.3 0.5   Bili, Direct 0.0 - 0.2 MG/DL 0.1 0.2   Albumin 3.4 - 5.0 g/dL 3.8 3.8   BUN 7.0 - 18 MG/DL 25 (H) 15   Creat 0.6 - 1.3 MG/DL 0.91 0.96   Na 136 - 145 mmol/L 138 140   K 3.5 - 5.5 mmol/L 4.0 4.4   Cl 100 - 111 mmol/L 105 106   CO2 21 - 32 mmol/L 31 32   Glucose 74 - 99 mg/dL 101 (H) 95     Cancer Screening Latest Ref Rng & Units 9/21/2022 3/21/2022 9/21/2021   AFP, Serum 0.0 - 8.4 ng/mL 2.8 2.4 2.6   AFP-L3% 0.0 - 9.9 % Comment Comment Comment     Serologies and Other Pertinent Laboratories:  09/10:  HB e antigen negative, HB e antibody positive, HB surface antigen positive, HB surface antibody negative. Liver Biopsy:  2000:  Knodell score 13 (8441). 2002:  Bridging fibrosis with characteristics suggestive of WADSWORTH.     09/2017. TRANSIENT HEPATIC ELASTOGRAPHY: E Range: 6.26-10.63 kPa, E Mean: 7.92 kPa, E Median: 7.45 kPa, E Std: 1.31 kPa    10/2018. TRANSIENT HEPATIC ELASTOGRAPHY:   E Range: 5.44-7.91 kPa  E Mean: 6.63 kPa  E Median: 6.76 kPa  E Std: 0.69 kPa    10/2019. TRANSIENT HEPATIC ELASTOGRAPHY:   E Range: 8.88-13.44 kPa, previous 5.44-7.91 kPA  E Mean: 10.54 kPa, previous 6.63 kPA  E Median: 10.15 kPa, previous 6.76 kPA  E Std: 1.58 kPa, previous 0.69 kPA    09/2020. FibroScan performed at [address]. EkPa was 11.0. IQR/med 15%. . The results suggested a fibrosis level of F3. The CAP score suggests no evidence of fatty liver. 09/2022. FibroScan performed at [address]. EkPa was 11.0. IQR/med 24%. . The results suggested a fibrosis level of F3. The CAP score suggests there is no significant hepatic steatosis. Endoscopic Procedures:  None available. Radiology:  05/2018. Ultrasound of the liver. Echogenic liver parenchyma may be due to steatosis or underlying hepatocellular disease. An area of decreased echotexture adjacent to the gallbladder likely represents a region of fatty sparing similar to the prior study. 10/2018. Ultrasound of the liver. Echogenic liver parenchyma may be due to steatosis or underlying hepatocellular disease. There are areas of fatty sparing without definite mass. 04/2019. Ultrasound of the liver. Echogenic liver, nonspecific but most likely fatty infiltration. There are areas of relative sparing of fatty infiltration. No suspicious lesion. 10/2019.   Ultrasound of the liver.  Hepatomegaly. Increased hepatic echogenicity, correlating with known hepatocellular dysfunction and hepatitis. No focal hepatic lesion is seen. 06/2020. Ultrasound of the liver. Coarsened echotexture. No focal mass. 12/2020. Ultrasound of the liver. Heterogeneously mild increased hepatic parenchymal echotexture. No evidence of solid mass. 06/2021. Ultrasound of the liver. Cirrhosis. No focal hepatic lesion. 12/2021. Ultrasound of the liver. Fibrotic hepatic parenchymal appearance without change. No mass lesion. 06/2022. Ultrasound of the liver. Increased hepatic echogenicity. No focal mass    Other Testing:  None available. Follow-up at the [facility] in 6 months for continued monitoring.       CIRILO ParisP-C  Liver [place] of [address], [address] [place] [address]   [phone]

## 2022-09-22 LAB
AFP L3 MFR SERPL: NORMAL % (ref 0–9.9)
AFP SERPL-MCNC: 2.8 NG/ML (ref 0–8.4)
HBV DNA SERPL NAA+PROBE-ACNC: NORMAL IU/ML
HBV DNA SERPL NAA+PROBE-LOG IU: NORMAL LOG10 IU/ML
TEST INFORMATION: NORMAL

## 2022-09-24 LAB — HDV AB SER QL IA: NEGATIVE

## 2022-10-01 DIAGNOSIS — B19.10 HEP B W/O COMA: ICD-10-CM

## 2022-10-04 RX ORDER — TENOFOVIR DISOPROXIL FUMARATE 300 MG/1
300 TABLET, FILM COATED ORAL DAILY
Qty: 90 TABLET | Refills: 3 | Status: SHIPPED | OUTPATIENT
Start: 2022-10-04

## 2022-10-11 ENCOUNTER — HOSPITAL ENCOUNTER (OUTPATIENT)
Dept: ULTRASOUND IMAGING | Age: 72
Discharge: HOME | End: 2022-10-11
Payer: MEDICARE

## 2022-10-11 DIAGNOSIS — B19.10 HEP B W/O COMA: ICD-10-CM

## 2022-10-11 PROCEDURE — 76705 ECHO EXAM OF ABDOMEN: CPT

## 2022-10-25 DIAGNOSIS — B19.10 HEP B W/O COMA: Primary | ICD-10-CM

## 2022-10-25 RX ORDER — DIAZEPAM 5 MG/1
TABLET ORAL
Qty: 4 TABLET | Refills: 0 | Status: SHIPPED | OUTPATIENT
Start: 2022-10-25

## 2022-11-03 ENCOUNTER — HOSPITAL ENCOUNTER (OUTPATIENT)
Dept: MRI IMAGING | Age: 72
Discharge: HOME | End: 2022-11-03
Payer: MEDICARE

## 2022-11-03 VITALS — BODY MASS INDEX: 25.69 KG/M2 | WEIGHT: 164 LBS

## 2022-11-03 DIAGNOSIS — B19.10 HEP B W/O COMA: ICD-10-CM

## 2022-11-03 LAB — CREAT UR-MCNC: 1 MG/DL (ref 0.6–1.3)

## 2022-11-03 PROCEDURE — 74183 MRI ABD W/O CNTR FLWD CNTR: CPT

## 2022-11-03 PROCEDURE — 74011250636 HC RX REV CODE- 250/636: Performed by: NURSE PRACTITIONER

## 2022-11-03 PROCEDURE — A9577 INJ MULTIHANCE: HCPCS | Performed by: NURSE PRACTITIONER

## 2022-11-03 PROCEDURE — 82565 ASSAY OF CREATININE: CPT

## 2022-11-03 RX ADMIN — GADOBENATE DIMEGLUMINE 15 ML: 529 INJECTION, SOLUTION INTRAVENOUS at 15:36

## 2023-03-22 ENCOUNTER — OFFICE VISIT (OUTPATIENT)
Age: 73
End: 2023-03-22
Payer: MEDICARE

## 2023-03-22 ENCOUNTER — HOSPITAL ENCOUNTER (OUTPATIENT)
Facility: HOSPITAL | Age: 73
Setting detail: SPECIMEN
Discharge: HOME | End: 2023-03-25
Payer: MEDICARE

## 2023-03-22 VITALS
HEIGHT: 67 IN | DIASTOLIC BLOOD PRESSURE: 57 MMHG | TEMPERATURE: 97.1 F | OXYGEN SATURATION: 99 % | SYSTOLIC BLOOD PRESSURE: 122 MMHG | WEIGHT: 161 LBS | BODY MASS INDEX: 25.27 KG/M2 | HEART RATE: 74 BPM

## 2023-03-22 DIAGNOSIS — B18.1 CHRONIC VIRAL HEPATITIS B WITHOUT DELTA AGENT AND WITHOUT COMA (HCC): ICD-10-CM

## 2023-03-22 DIAGNOSIS — B18.1 CHRONIC VIRAL HEPATITIS B WITHOUT DELTA AGENT AND WITHOUT COMA (HCC): Primary | ICD-10-CM

## 2023-03-22 LAB
ALBUMIN SERPL-MCNC: 3.6 G/DL (ref 3.4–5)
ALBUMIN/GLOB SERPL: 1.1 (ref 0.8–1.7)
ALP SERPL-CCNC: 121 U/L (ref 45–117)
ALT SERPL-CCNC: 20 U/L (ref 16–61)
ANION GAP SERPL CALC-SCNC: 2 MMOL/L (ref 3–18)
AST SERPL-CCNC: 10 U/L (ref 10–38)
BASOPHILS # BLD: 0 K/UL (ref 0–0.1)
BASOPHILS NFR BLD: 1 % (ref 0–2)
BILIRUB DIRECT SERPL-MCNC: 0.2 MG/DL (ref 0–0.2)
BILIRUB SERPL-MCNC: 0.5 MG/DL (ref 0.2–1)
BUN SERPL-MCNC: 28 MG/DL (ref 7–18)
BUN/CREAT SERPL: 34 (ref 12–20)
CALCIUM SERPL-MCNC: 8.9 MG/DL (ref 8.5–10.1)
CHLORIDE SERPL-SCNC: 107 MMOL/L (ref 100–111)
CO2 SERPL-SCNC: 31 MMOL/L (ref 21–32)
CREAT SERPL-MCNC: 0.83 MG/DL (ref 0.6–1.3)
DIFFERENTIAL METHOD BLD: ABNORMAL
EOSINOPHIL # BLD: 0.2 K/UL (ref 0–0.4)
EOSINOPHIL NFR BLD: 7 % (ref 0–5)
ERYTHROCYTE [DISTWIDTH] IN BLOOD BY AUTOMATED COUNT: 13.3 % (ref 11.6–14.5)
GLOBULIN SER CALC-MCNC: 3.2 G/DL (ref 2–4)
GLUCOSE SERPL-MCNC: 104 MG/DL (ref 74–99)
HCT VFR BLD AUTO: 38.2 % (ref 36–48)
HGB BLD-MCNC: 12.7 G/DL (ref 13–16)
IMM GRANULOCYTES # BLD AUTO: 0 K/UL (ref 0–0.04)
IMM GRANULOCYTES NFR BLD AUTO: 1 % (ref 0–0.5)
LYMPHOCYTES # BLD: 1 K/UL (ref 0.9–3.6)
LYMPHOCYTES NFR BLD: 27 % (ref 21–52)
MCH RBC QN AUTO: 33.4 PG (ref 24–34)
MCHC RBC AUTO-ENTMCNC: 33.2 G/DL (ref 31–37)
MCV RBC AUTO: 100.5 FL (ref 78–100)
MONOCYTES # BLD: 0.3 K/UL (ref 0.05–1.2)
MONOCYTES NFR BLD: 7 % (ref 3–10)
NEUTS SEG # BLD: 2.1 K/UL (ref 1.8–8)
NEUTS SEG NFR BLD: 58 % (ref 40–73)
NRBC # BLD: 0 K/UL (ref 0–0.01)
NRBC BLD-RTO: 0 PER 100 WBC
PLATELET # BLD AUTO: 166 K/UL (ref 135–420)
PMV BLD AUTO: 8 FL (ref 9.2–11.8)
POTASSIUM SERPL-SCNC: 3.8 MMOL/L (ref 3.5–5.5)
PROT SERPL-MCNC: 6.8 G/DL (ref 6.4–8.2)
RBC # BLD AUTO: 3.8 M/UL (ref 4.35–5.65)
SODIUM SERPL-SCNC: 140 MMOL/L (ref 136–145)
WBC # BLD AUTO: 3.7 K/UL (ref 4.6–13.2)

## 2023-03-22 PROCEDURE — G8484 FLU IMMUNIZE NO ADMIN: HCPCS | Performed by: NURSE PRACTITIONER

## 2023-03-22 PROCEDURE — 80076 HEPATIC FUNCTION PANEL: CPT

## 2023-03-22 PROCEDURE — G8427 DOCREV CUR MEDS BY ELIG CLIN: HCPCS | Performed by: NURSE PRACTITIONER

## 2023-03-22 PROCEDURE — 86692 HEPATITIS DELTA AGENT ANTBDY: CPT

## 2023-03-22 PROCEDURE — 87517 HEPATITIS B DNA QUANT: CPT

## 2023-03-22 PROCEDURE — G8417 CALC BMI ABV UP PARAM F/U: HCPCS | Performed by: NURSE PRACTITIONER

## 2023-03-22 PROCEDURE — 3074F SYST BP LT 130 MM HG: CPT | Performed by: NURSE PRACTITIONER

## 2023-03-22 PROCEDURE — 82107 ALPHA-FETOPROTEIN L3: CPT

## 2023-03-22 PROCEDURE — 99214 OFFICE O/P EST MOD 30 MIN: CPT | Performed by: NURSE PRACTITIONER

## 2023-03-22 PROCEDURE — 1123F ACP DISCUSS/DSCN MKR DOCD: CPT | Performed by: NURSE PRACTITIONER

## 2023-03-22 PROCEDURE — 3078F DIAST BP <80 MM HG: CPT | Performed by: NURSE PRACTITIONER

## 2023-03-22 PROCEDURE — 85025 COMPLETE CBC W/AUTO DIFF WBC: CPT

## 2023-03-22 PROCEDURE — 80048 BASIC METABOLIC PNL TOTAL CA: CPT

## 2023-03-22 PROCEDURE — 3017F COLORECTAL CA SCREEN DOC REV: CPT | Performed by: NURSE PRACTITIONER

## 2023-03-22 PROCEDURE — 36415 COLL VENOUS BLD VENIPUNCTURE: CPT

## 2023-03-22 PROCEDURE — 4004F PT TOBACCO SCREEN RCVD TLK: CPT | Performed by: NURSE PRACTITIONER

## 2023-03-22 RX ORDER — NIACIN 500 MG/1
TABLET, EXTENDED RELEASE ORAL
COMMUNITY
Start: 2023-01-19

## 2023-03-22 RX ORDER — DIAZEPAM 5 MG/1
TABLET ORAL
Qty: 4 TABLET | Refills: 0 | Status: SHIPPED | OUTPATIENT
Start: 2023-03-22 | End: 2023-05-01

## 2023-03-22 NOTE — PROGRESS NOTES
on delayed phase images. LIRADS 3. Other Testing:  None available. Follow-up at the [facility] in 6 months for fibroscan and continued monitoring.       CARMELO Nunes-BHAVIK  Liver [place] of [address], [address] [place] [address] Jose Armando Brooke Lozano, [address] [place] [address]   [phone]

## 2023-03-23 LAB
AFP L3 MFR SERPL: NORMAL % (ref 0–9.9)
AFP SERPL-MCNC: 2.1 NG/ML (ref 0–8.4)
HBV DNA SERPL NAA+PROBE-ACNC: NORMAL IU/ML
HBV DNA SERPL NAA+PROBE-LOG IU: NORMAL LOG10 IU/ML
TEST INFORMATION: NORMAL

## 2023-03-28 LAB — HDV AB SER QL IA: NEGATIVE

## 2023-04-07 ENCOUNTER — HOSPITAL ENCOUNTER (OUTPATIENT)
Facility: HOSPITAL | Age: 73
End: 2023-04-07
Payer: MEDICARE

## 2023-04-07 DIAGNOSIS — B18.1 CHRONIC VIRAL HEPATITIS B WITHOUT DELTA AGENT AND WITHOUT COMA (HCC): ICD-10-CM

## 2023-04-07 LAB — CREAT UR-MCNC: 1 MG/DL (ref 0.6–1.3)

## 2023-04-07 PROCEDURE — 6360000004 HC RX CONTRAST MEDICATION: Performed by: NURSE PRACTITIONER

## 2023-04-07 PROCEDURE — A9577 INJ MULTIHANCE: HCPCS | Performed by: NURSE PRACTITIONER

## 2023-04-07 PROCEDURE — 82565 ASSAY OF CREATININE: CPT

## 2023-04-07 PROCEDURE — 74183 MRI ABD W/O CNTR FLWD CNTR: CPT

## 2023-04-07 RX ADMIN — GADOBENATE DIMEGLUMINE 15 ML: 529 INJECTION, SOLUTION INTRAVENOUS at 09:40

## 2023-04-17 ENCOUNTER — TELEPHONE (OUTPATIENT)
Age: 73
End: 2023-04-17

## 2023-09-13 ENCOUNTER — HOSPITAL ENCOUNTER (OUTPATIENT)
Facility: HOSPITAL | Age: 73
Setting detail: SPECIMEN
Discharge: HOME | End: 2023-09-16
Payer: MEDICARE

## 2023-09-13 ENCOUNTER — OFFICE VISIT (OUTPATIENT)
Age: 73
End: 2023-09-13
Payer: MEDICARE

## 2023-09-13 VITALS
BODY MASS INDEX: 24.64 KG/M2 | HEART RATE: 78 BPM | TEMPERATURE: 97.9 F | HEIGHT: 67 IN | OXYGEN SATURATION: 98 % | DIASTOLIC BLOOD PRESSURE: 58 MMHG | WEIGHT: 157 LBS | SYSTOLIC BLOOD PRESSURE: 112 MMHG

## 2023-09-13 DIAGNOSIS — B18.1 CHRONIC VIRAL HEPATITIS B WITHOUT DELTA AGENT AND WITHOUT COMA (HCC): Primary | ICD-10-CM

## 2023-09-13 DIAGNOSIS — B18.1 CHRONIC VIRAL HEPATITIS B WITHOUT DELTA AGENT AND WITHOUT COMA (HCC): ICD-10-CM

## 2023-09-13 LAB
ALBUMIN SERPL-MCNC: 3.4 G/DL (ref 3.4–5)
ALBUMIN/GLOB SERPL: 1.1 (ref 0.8–1.7)
ALP SERPL-CCNC: 143 U/L (ref 45–117)
ALT SERPL-CCNC: 25 U/L (ref 16–61)
ANION GAP SERPL CALC-SCNC: 1 MMOL/L (ref 3–18)
AST SERPL-CCNC: 12 U/L (ref 10–38)
BASOPHILS # BLD: 0 K/UL (ref 0–0.1)
BASOPHILS NFR BLD: 0 % (ref 0–2)
BILIRUB DIRECT SERPL-MCNC: 0.1 MG/DL (ref 0–0.2)
BILIRUB SERPL-MCNC: 0.4 MG/DL (ref 0.2–1)
BUN SERPL-MCNC: 30 MG/DL (ref 7–18)
BUN/CREAT SERPL: 29 (ref 12–20)
CALCIUM SERPL-MCNC: 8.7 MG/DL (ref 8.5–10.1)
CHLORIDE SERPL-SCNC: 110 MMOL/L (ref 100–111)
CO2 SERPL-SCNC: 29 MMOL/L (ref 21–32)
CREAT SERPL-MCNC: 1.03 MG/DL (ref 0.6–1.3)
DIFFERENTIAL METHOD BLD: ABNORMAL
EOSINOPHIL # BLD: 0.4 K/UL (ref 0–0.4)
EOSINOPHIL NFR BLD: 9 % (ref 0–5)
ERYTHROCYTE [DISTWIDTH] IN BLOOD BY AUTOMATED COUNT: 13.7 % (ref 11.6–14.5)
GLOBULIN SER CALC-MCNC: 3.1 G/DL (ref 2–4)
GLUCOSE SERPL-MCNC: 102 MG/DL (ref 74–99)
HCT VFR BLD AUTO: 37.2 % (ref 36–48)
HGB BLD-MCNC: 11.9 G/DL (ref 13–16)
IMM GRANULOCYTES # BLD AUTO: 0 K/UL (ref 0–0.04)
IMM GRANULOCYTES NFR BLD AUTO: 1 % (ref 0–0.5)
LYMPHOCYTES # BLD: 1.2 K/UL (ref 0.9–3.6)
LYMPHOCYTES NFR BLD: 26 % (ref 21–52)
MCH RBC QN AUTO: 32.7 PG (ref 24–34)
MCHC RBC AUTO-ENTMCNC: 32 G/DL (ref 31–37)
MCV RBC AUTO: 102.2 FL (ref 78–100)
MONOCYTES # BLD: 0.4 K/UL (ref 0.05–1.2)
MONOCYTES NFR BLD: 8 % (ref 3–10)
NEUTS SEG # BLD: 2.6 K/UL (ref 1.8–8)
NEUTS SEG NFR BLD: 56 % (ref 40–73)
NRBC # BLD: 0 K/UL (ref 0–0.01)
NRBC BLD-RTO: 0 PER 100 WBC
PLATELET # BLD AUTO: 165 K/UL (ref 135–420)
PMV BLD AUTO: 8.2 FL (ref 9.2–11.8)
POTASSIUM SERPL-SCNC: 4.1 MMOL/L (ref 3.5–5.5)
PROT SERPL-MCNC: 6.5 G/DL (ref 6.4–8.2)
RBC # BLD AUTO: 3.64 M/UL (ref 4.35–5.65)
SODIUM SERPL-SCNC: 140 MMOL/L (ref 136–145)
WBC # BLD AUTO: 4.6 K/UL (ref 4.6–13.2)

## 2023-09-13 PROCEDURE — 99214 OFFICE O/P EST MOD 30 MIN: CPT | Performed by: NURSE PRACTITIONER

## 2023-09-13 PROCEDURE — 1123F ACP DISCUSS/DSCN MKR DOCD: CPT | Performed by: NURSE PRACTITIONER

## 2023-09-13 PROCEDURE — 3074F SYST BP LT 130 MM HG: CPT | Performed by: NURSE PRACTITIONER

## 2023-09-13 PROCEDURE — 36415 COLL VENOUS BLD VENIPUNCTURE: CPT

## 2023-09-13 PROCEDURE — 82107 ALPHA-FETOPROTEIN L3: CPT

## 2023-09-13 PROCEDURE — 3078F DIAST BP <80 MM HG: CPT | Performed by: NURSE PRACTITIONER

## 2023-09-13 PROCEDURE — 91200 LIVER ELASTOGRAPHY: CPT | Performed by: NURSE PRACTITIONER

## 2023-09-13 PROCEDURE — 80048 BASIC METABOLIC PNL TOTAL CA: CPT

## 2023-09-13 PROCEDURE — 4004F PT TOBACCO SCREEN RCVD TLK: CPT | Performed by: NURSE PRACTITIONER

## 2023-09-13 PROCEDURE — 3017F COLORECTAL CA SCREEN DOC REV: CPT | Performed by: NURSE PRACTITIONER

## 2023-09-13 PROCEDURE — 80076 HEPATIC FUNCTION PANEL: CPT

## 2023-09-13 PROCEDURE — 85025 COMPLETE CBC W/AUTO DIFF WBC: CPT

## 2023-09-13 PROCEDURE — G8427 DOCREV CUR MEDS BY ELIG CLIN: HCPCS | Performed by: NURSE PRACTITIONER

## 2023-09-13 PROCEDURE — G8420 CALC BMI NORM PARAMETERS: HCPCS | Performed by: NURSE PRACTITIONER

## 2023-09-13 PROCEDURE — 87517 HEPATITIS B DNA QUANT: CPT

## 2023-09-13 RX ORDER — LOSARTAN POTASSIUM AND HYDROCHLOROTHIAZIDE 12.5; 1 MG/1; MG/1
1 TABLET ORAL DAILY
COMMUNITY

## 2023-09-13 RX ORDER — ASPIRIN 81 MG/1
81 TABLET, CHEWABLE ORAL EVERY MORNING
COMMUNITY

## 2023-09-13 RX ORDER — TENOFOVIR DISOPROXIL FUMARATE 300 MG/1
TABLET, FILM COATED ORAL
Qty: 90 TABLET | Refills: 3 | Status: ACTIVE | OUTPATIENT
Start: 2023-09-13

## 2023-09-13 NOTE — PROGRESS NOTES
MD Sylvia, FACP, [place], [place]      TAMARA Johnson, [facility]   Jyoti [facility], [facility]   Maria L Wolf, CARMELO Mariscal-BHAVIK Martin, AGPCNP-BC   Arnulfo Sharrigail, [facility]      [address]   at [facility]   [address] [facility], [address]   [phone]   FAX: [phone]  Liver [place] of [facility]   at [facility], [address] [place] [address], [address]   [phone]   FAX: [phone]       SUBJECTIVE:  Skinny Hurley returns to the [address] [place] [address],[address] of [facility] for fibroscan assessment of hepatic fibrosis and for monitoring of chronic HBV. The active problem list, all pertinent past medical history, medications, liver histology, endoscopic studies, radiologic findings and laboratory findings related to the liver disorder were reviewed with the patient. Most recent imaging was performed with MRI in 04/2023. Subcentimeter left hepatic lobe T2 hyperintensities demonstrate progressive peripheral nodular enhancement consistent with hemangiomas and are unchanged in size compared to prior MR dated November 2022. LI-RADS II. The most recent laboratory studies indicate that the liver transaminases are normal, alkaline phosphatase is elevated, tests of hepatic synthetic and metabolic function are normal, and the platelet count is normal.        HBV is being treated with Viread. HBV DNA in 09/2022 was undetectable. The patient has no complaints which can be attributed to liver disease. The patient completes all daily activities without any functional limitations.  The patient has not experienced fatigue, fevers, chills, shortness of breath, chest pain, pain in the right side over the liver, diffuse abdominal pain, nausea, vomiting, constipation,

## 2023-09-14 LAB
AFP L3 MFR SERPL: NORMAL % (ref 0–9.9)
AFP SERPL-MCNC: 2.4 NG/ML (ref 0–8.4)
HBV DNA SERPL NAA+PROBE-ACNC: NORMAL IU/ML
HBV DNA SERPL NAA+PROBE-LOG IU: NORMAL LOG10 IU/ML
TEST INFORMATION: NORMAL

## 2023-10-20 ENCOUNTER — HOSPITAL ENCOUNTER (OUTPATIENT)
Facility: HOSPITAL | Age: 73
End: 2023-10-20
Payer: MEDICARE

## 2023-10-20 DIAGNOSIS — B18.1 CHRONIC VIRAL HEPATITIS B WITHOUT DELTA AGENT AND WITHOUT COMA (HCC): ICD-10-CM

## 2023-10-20 LAB — CREAT UR-MCNC: 1.1 MG/DL (ref 0.6–1.3)

## 2023-10-20 PROCEDURE — 74183 MRI ABD W/O CNTR FLWD CNTR: CPT

## 2023-10-20 PROCEDURE — 82565 ASSAY OF CREATININE: CPT

## 2023-10-20 PROCEDURE — A9579 GAD-BASE MR CONTRAST NOS,1ML: HCPCS | Performed by: NURSE PRACTITIONER

## 2023-10-20 PROCEDURE — 6360000004 HC RX CONTRAST MEDICATION: Performed by: NURSE PRACTITIONER

## 2023-10-20 RX ADMIN — GADOTERIDOL 15 ML: 279.3 INJECTION, SOLUTION INTRAVENOUS at 11:26

## 2024-03-13 ENCOUNTER — HOSPITAL ENCOUNTER (OUTPATIENT)
Facility: HOSPITAL | Age: 74
Setting detail: SPECIMEN
Discharge: HOME | End: 2024-03-16
Payer: MEDICARE

## 2024-03-13 ENCOUNTER — OFFICE VISIT (OUTPATIENT)
Age: 74
End: 2024-03-13
Payer: MEDICARE

## 2024-03-13 VITALS
RESPIRATION RATE: 16 BRPM | TEMPERATURE: 98.9 F | OXYGEN SATURATION: 98 % | HEIGHT: 67 IN | HEART RATE: 69 BPM | BODY MASS INDEX: 25.11 KG/M2 | SYSTOLIC BLOOD PRESSURE: 108 MMHG | WEIGHT: 160 LBS | DIASTOLIC BLOOD PRESSURE: 66 MMHG

## 2024-03-13 DIAGNOSIS — B18.1 CHRONIC VIRAL HEPATITIS B WITHOUT DELTA AGENT AND WITHOUT COMA (HCC): Primary | ICD-10-CM

## 2024-03-13 DIAGNOSIS — B18.1 CHRONIC VIRAL HEPATITIS B WITHOUT DELTA AGENT AND WITHOUT COMA (HCC): ICD-10-CM

## 2024-03-13 LAB
ALBUMIN SERPL-MCNC: 3.9 G/DL (ref 3.4–5)
ALBUMIN/GLOB SERPL: 1.4 (ref 0.8–1.7)
ALP SERPL-CCNC: 118 U/L (ref 45–117)
ALT SERPL-CCNC: 20 U/L (ref 16–61)
ANION GAP SERPL CALC-SCNC: 3 MMOL/L (ref 3–18)
AST SERPL-CCNC: 14 U/L (ref 10–38)
BASOPHILS # BLD: 0.1 K/UL (ref 0–0.1)
BASOPHILS NFR BLD: 2 % (ref 0–2)
BILIRUB DIRECT SERPL-MCNC: 0.2 MG/DL (ref 0–0.2)
BILIRUB SERPL-MCNC: 0.7 MG/DL (ref 0.2–1)
BUN SERPL-MCNC: 32 MG/DL (ref 7–18)
BUN/CREAT SERPL: 29 (ref 12–20)
CALCIUM SERPL-MCNC: 9.2 MG/DL (ref 8.5–10.1)
CHLORIDE SERPL-SCNC: 106 MMOL/L (ref 100–111)
CO2 SERPL-SCNC: 31 MMOL/L (ref 21–32)
CREAT SERPL-MCNC: 1.09 MG/DL (ref 0.6–1.3)
DIFFERENTIAL METHOD BLD: ABNORMAL
EOSINOPHIL # BLD: 0.6 K/UL (ref 0–0.4)
EOSINOPHIL NFR BLD: 15 % (ref 0–5)
ERYTHROCYTE [DISTWIDTH] IN BLOOD BY AUTOMATED COUNT: 13.2 % (ref 11.6–14.5)
FERRITIN SERPL-MCNC: 153 NG/ML (ref 8–388)
GLOBULIN SER CALC-MCNC: 2.8 G/DL (ref 2–4)
GLUCOSE SERPL-MCNC: 96 MG/DL (ref 74–99)
HCT VFR BLD AUTO: 39.2 % (ref 36–48)
HGB BLD-MCNC: 13.2 G/DL (ref 13–16)
IMM GRANULOCYTES # BLD AUTO: 0 K/UL (ref 0–0.04)
IMM GRANULOCYTES NFR BLD AUTO: 1 % (ref 0–0.5)
IRON SATN MFR SERPL: 40 % (ref 20–50)
IRON SERPL-MCNC: 105 UG/DL (ref 50–175)
LYMPHOCYTES # BLD: 1.2 K/UL (ref 0.9–3.6)
LYMPHOCYTES NFR BLD: 31 % (ref 21–52)
MCH RBC QN AUTO: 34.4 PG (ref 24–34)
MCHC RBC AUTO-ENTMCNC: 33.7 G/DL (ref 31–37)
MCV RBC AUTO: 102.1 FL (ref 78–100)
MONOCYTES # BLD: 0.3 K/UL (ref 0.05–1.2)
MONOCYTES NFR BLD: 7 % (ref 3–10)
NEUTS SEG # BLD: 1.9 K/UL (ref 1.8–8)
NEUTS SEG NFR BLD: 46 % (ref 40–73)
NRBC # BLD: 0 K/UL (ref 0–0.01)
NRBC BLD-RTO: 0 PER 100 WBC
PLATELET # BLD AUTO: 148 K/UL (ref 135–420)
PMV BLD AUTO: 8.2 FL (ref 9.2–11.8)
POTASSIUM SERPL-SCNC: 4.1 MMOL/L (ref 3.5–5.5)
PROT SERPL-MCNC: 6.7 G/DL (ref 6.4–8.2)
RBC # BLD AUTO: 3.84 M/UL (ref 4.35–5.65)
SODIUM SERPL-SCNC: 140 MMOL/L (ref 136–145)
TIBC SERPL-MCNC: 261 UG/DL (ref 250–450)
WBC # BLD AUTO: 4.1 K/UL (ref 4.6–13.2)

## 2024-03-13 PROCEDURE — 80048 BASIC METABOLIC PNL TOTAL CA: CPT

## 2024-03-13 PROCEDURE — 3078F DIAST BP <80 MM HG: CPT | Performed by: NURSE PRACTITIONER

## 2024-03-13 PROCEDURE — 80076 HEPATIC FUNCTION PANEL: CPT

## 2024-03-13 PROCEDURE — 36415 COLL VENOUS BLD VENIPUNCTURE: CPT

## 2024-03-13 PROCEDURE — 82728 ASSAY OF FERRITIN: CPT

## 2024-03-13 PROCEDURE — 99214 OFFICE O/P EST MOD 30 MIN: CPT | Performed by: NURSE PRACTITIONER

## 2024-03-13 PROCEDURE — 83540 ASSAY OF IRON: CPT

## 2024-03-13 PROCEDURE — G8484 FLU IMMUNIZE NO ADMIN: HCPCS | Performed by: NURSE PRACTITIONER

## 2024-03-13 PROCEDURE — G8417 CALC BMI ABV UP PARAM F/U: HCPCS | Performed by: NURSE PRACTITIONER

## 2024-03-13 PROCEDURE — 83550 IRON BINDING TEST: CPT

## 2024-03-13 PROCEDURE — 85025 COMPLETE CBC W/AUTO DIFF WBC: CPT

## 2024-03-13 PROCEDURE — 1036F TOBACCO NON-USER: CPT | Performed by: NURSE PRACTITIONER

## 2024-03-13 PROCEDURE — G8428 CUR MEDS NOT DOCUMENT: HCPCS | Performed by: NURSE PRACTITIONER

## 2024-03-13 PROCEDURE — 3017F COLORECTAL CA SCREEN DOC REV: CPT | Performed by: NURSE PRACTITIONER

## 2024-03-13 PROCEDURE — 87517 HEPATITIS B DNA QUANT: CPT

## 2024-03-13 PROCEDURE — 1123F ACP DISCUSS/DSCN MKR DOCD: CPT | Performed by: NURSE PRACTITIONER

## 2024-03-13 PROCEDURE — 3074F SYST BP LT 130 MM HG: CPT | Performed by: NURSE PRACTITIONER

## 2024-03-13 PROCEDURE — 82107 ALPHA-FETOPROTEIN L3: CPT

## 2024-03-13 RX ORDER — LORATADINE 10 MG/1
TABLET ORAL
COMMUNITY
Start: 2015-10-12

## 2024-03-13 RX ORDER — FLUTICASONE PROPIONATE 50 MCG
1 SPRAY, SUSPENSION (ML) NASAL DAILY
COMMUNITY

## 2024-03-13 NOTE — PROGRESS NOTES
[facility]      Bret Rocha MD, FACP, FACG, FAASLD      TAMARA Dias, [facility]   Elsa Mayenryan, [facility]   Katya Brandin, [facility]-  Orlando Warner, [facility]   Kristina Barron, [facility]   Jazmin Aston, [facility]   at [facility]   [address], [address]   [place]  [address] [phone]   FAX: [phone]  [facility]   at [facility]   [address], [address]   [place]  [address] [phone]   FAX: [phone]       SUBJECTIVE:  Kadeem Gregorio returns to the [facility] for fibroscan assessment of hepatic fibrosis and for monitoring of chronic HBV.      The active problem list, all pertinent past medical history, medications, liver histology, endoscopic studies, radiologic findings and laboratory findings related to the liver disorder were reviewed with the patient.      Most recent imaging was performed with MRI in 04/2023.  Subcentimeter left hepatic lobe T2 hyperintensities demonstrate progressive peripheral nodular enhancement consistent with hemangiomas and are unchanged in size compared to prior MR dated November 2022.  LI-RADS II.     The most recent laboratory studies indicate that the liver transaminases are normal, alkaline phosphatase is elevated, tests of hepatic synthetic and metabolic function are normal, and the platelet count is normal.        HBV is being treated with Viread.  HBV DNA in 09/2022 was undetectable.     The patient has no complaints which can be attributed to liver disease.    The patient completes all daily activities without any functional limitations. The patient has not experienced fatigue, fevers, chills, shortness of breath, chest pain, pain in the right side over the liver, diffuse abdominal pain, nausea, vomiting, constipation,

## 2024-04-10 ENCOUNTER — HOSPITAL ENCOUNTER (OUTPATIENT)
Facility: HOSPITAL | Age: 74
Discharge: HOME | End: 2024-04-13
Payer: MEDICARE

## 2024-04-10 DIAGNOSIS — B18.1 CHRONIC VIRAL HEPATITIS B WITHOUT DELTA AGENT AND WITHOUT COMA (HCC): ICD-10-CM

## 2024-04-10 PROCEDURE — A9577 INJ MULTIHANCE: HCPCS | Performed by: NURSE PRACTITIONER

## 2024-04-10 PROCEDURE — 6360000004 HC RX CONTRAST MEDICATION: Performed by: NURSE PRACTITIONER

## 2024-04-10 PROCEDURE — 74183 MRI ABD W/O CNTR FLWD CNTR: CPT

## 2024-04-10 RX ADMIN — GADOBENATE DIMEGLUMINE 15 ML: 529 INJECTION, SOLUTION INTRAVENOUS at 09:05

## 2024-04-17 ENCOUNTER — TELEPHONE (OUTPATIENT)
Age: 74
End: 2024-04-17

## 2024-08-12 RX ORDER — TENOFOVIR DISOPROXIL FUMARATE 300 MG/1
TABLET, FILM COATED ORAL
Qty: 90 TABLET | Refills: 3 | Status: ACTIVE | OUTPATIENT
Start: 2024-08-12

## 2024-09-25 ENCOUNTER — OFFICE VISIT (OUTPATIENT)
Age: 74
End: 2024-09-25
Payer: MEDICARE

## 2024-09-25 ENCOUNTER — HOSPITAL ENCOUNTER (OUTPATIENT)
Facility: HOSPITAL | Age: 74
Setting detail: SPECIMEN
Discharge: HOME | End: 2024-09-28
Payer: MEDICARE

## 2024-09-25 VITALS
DIASTOLIC BLOOD PRESSURE: 73 MMHG | HEIGHT: 67 IN | OXYGEN SATURATION: 98 % | SYSTOLIC BLOOD PRESSURE: 128 MMHG | HEART RATE: 71 BPM | WEIGHT: 162.2 LBS | BODY MASS INDEX: 25.46 KG/M2 | TEMPERATURE: 97.3 F

## 2024-09-25 DIAGNOSIS — B18.1 CHRONIC VIRAL HEPATITIS B WITHOUT DELTA AGENT AND WITHOUT COMA (HCC): ICD-10-CM

## 2024-09-25 DIAGNOSIS — B18.1 CHRONIC VIRAL HEPATITIS B WITHOUT DELTA AGENT AND WITHOUT COMA (HCC): Primary | ICD-10-CM

## 2024-09-25 LAB
ALBUMIN SERPL-MCNC: 3.8 G/DL (ref 3.4–5)
ALBUMIN/GLOB SERPL: 1.3 (ref 0.8–1.7)
ALP SERPL-CCNC: 130 U/L (ref 45–117)
ALT SERPL-CCNC: 26 U/L (ref 16–61)
ANION GAP SERPL CALC-SCNC: 6 MMOL/L (ref 3–18)
AST SERPL-CCNC: 17 U/L (ref 10–38)
BASOPHILS # BLD: 0.1 K/UL (ref 0–0.1)
BASOPHILS NFR BLD: 1 % (ref 0–2)
BILIRUB DIRECT SERPL-MCNC: 0.1 MG/DL (ref 0–0.2)
BILIRUB SERPL-MCNC: 0.4 MG/DL (ref 0.2–1)
BUN SERPL-MCNC: 22 MG/DL (ref 7–18)
BUN/CREAT SERPL: 22 (ref 12–20)
CALCIUM SERPL-MCNC: 8.9 MG/DL (ref 8.5–10.1)
CHLORIDE SERPL-SCNC: 106 MMOL/L (ref 100–111)
CO2 SERPL-SCNC: 27 MMOL/L (ref 21–32)
CREAT SERPL-MCNC: 0.98 MG/DL (ref 0.6–1.3)
DIFFERENTIAL METHOD BLD: ABNORMAL
EOSINOPHIL # BLD: 0.6 K/UL (ref 0–0.4)
EOSINOPHIL NFR BLD: 13 % (ref 0–5)
ERYTHROCYTE [DISTWIDTH] IN BLOOD BY AUTOMATED COUNT: 13.2 % (ref 11.6–14.5)
GLOBULIN SER CALC-MCNC: 2.9 G/DL (ref 2–4)
GLUCOSE SERPL-MCNC: 97 MG/DL (ref 74–99)
HCT VFR BLD AUTO: 38.6 % (ref 36–48)
HGB BLD-MCNC: 12.8 G/DL (ref 13–16)
IMM GRANULOCYTES # BLD AUTO: 0 K/UL (ref 0–0.04)
IMM GRANULOCYTES NFR BLD AUTO: 1 % (ref 0–0.5)
INR PPP: 0.9 (ref 0.9–1.1)
LYMPHOCYTES # BLD: 1.3 K/UL (ref 0.9–3.6)
LYMPHOCYTES NFR BLD: 27 % (ref 21–52)
MCH RBC QN AUTO: 33.4 PG (ref 24–34)
MCHC RBC AUTO-ENTMCNC: 33.2 G/DL (ref 31–37)
MCV RBC AUTO: 100.8 FL (ref 78–100)
MONOCYTES # BLD: 0.4 K/UL (ref 0.05–1.2)
MONOCYTES NFR BLD: 7 % (ref 3–10)
NEUTS SEG # BLD: 2.5 K/UL (ref 1.8–8)
NEUTS SEG NFR BLD: 52 % (ref 40–73)
NRBC # BLD: 0 K/UL (ref 0–0.01)
NRBC BLD-RTO: 0 PER 100 WBC
PLATELET # BLD AUTO: 144 K/UL (ref 135–420)
PMV BLD AUTO: 8 FL (ref 9.2–11.8)
POTASSIUM SERPL-SCNC: 3.9 MMOL/L (ref 3.5–5.5)
PROT SERPL-MCNC: 6.7 G/DL (ref 6.4–8.2)
PROTHROMBIN TIME: 12.7 SEC (ref 11.9–14.9)
RBC # BLD AUTO: 3.83 M/UL (ref 4.35–5.65)
SODIUM SERPL-SCNC: 139 MMOL/L (ref 136–145)
WBC # BLD AUTO: 4.9 K/UL (ref 4.6–13.2)

## 2024-09-25 PROCEDURE — 80076 HEPATIC FUNCTION PANEL: CPT

## 2024-09-25 PROCEDURE — 87517 HEPATITIS B DNA QUANT: CPT

## 2024-09-25 PROCEDURE — G8417 CALC BMI ABV UP PARAM F/U: HCPCS | Performed by: NURSE PRACTITIONER

## 2024-09-25 PROCEDURE — 1036F TOBACCO NON-USER: CPT | Performed by: NURSE PRACTITIONER

## 2024-09-25 PROCEDURE — 3078F DIAST BP <80 MM HG: CPT | Performed by: NURSE PRACTITIONER

## 2024-09-25 PROCEDURE — 85025 COMPLETE CBC W/AUTO DIFF WBC: CPT

## 2024-09-25 PROCEDURE — 3074F SYST BP LT 130 MM HG: CPT | Performed by: NURSE PRACTITIONER

## 2024-09-25 PROCEDURE — 82107 ALPHA-FETOPROTEIN L3: CPT

## 2024-09-25 PROCEDURE — G8428 CUR MEDS NOT DOCUMENT: HCPCS | Performed by: NURSE PRACTITIONER

## 2024-09-25 PROCEDURE — 1123F ACP DISCUSS/DSCN MKR DOCD: CPT | Performed by: NURSE PRACTITIONER

## 2024-09-25 PROCEDURE — 3017F COLORECTAL CA SCREEN DOC REV: CPT | Performed by: NURSE PRACTITIONER

## 2024-09-25 PROCEDURE — 36415 COLL VENOUS BLD VENIPUNCTURE: CPT

## 2024-09-25 PROCEDURE — 91200 LIVER ELASTOGRAPHY: CPT | Performed by: NURSE PRACTITIONER

## 2024-09-25 PROCEDURE — 85610 PROTHROMBIN TIME: CPT

## 2024-09-25 PROCEDURE — 80048 BASIC METABOLIC PNL TOTAL CA: CPT

## 2024-09-25 PROCEDURE — 99214 OFFICE O/P EST MOD 30 MIN: CPT | Performed by: NURSE PRACTITIONER

## 2024-09-25 RX ORDER — CIPROFLOXACIN 500 MG/1
TABLET, FILM COATED ORAL
COMMUNITY
End: 2024-09-25 | Stop reason: ALTCHOICE

## 2024-09-27 LAB
AFP L3 MFR SERPL: NORMAL % (ref 0–9.9)
AFP SERPL-MCNC: 2.2 NG/ML (ref 0–8.4)

## 2024-10-14 ENCOUNTER — HOSPITAL ENCOUNTER (OUTPATIENT)
Facility: HOSPITAL | Age: 74
Discharge: HOME | End: 2024-10-17
Payer: MEDICARE

## 2024-10-14 VITALS — BODY MASS INDEX: 25.06 KG/M2 | WEIGHT: 160 LBS

## 2024-10-14 DIAGNOSIS — B18.1 CHRONIC VIRAL HEPATITIS B WITHOUT DELTA AGENT AND WITHOUT COMA (HCC): ICD-10-CM

## 2024-10-14 PROCEDURE — 6360000004 HC RX CONTRAST MEDICATION: Performed by: NURSE PRACTITIONER

## 2024-10-14 PROCEDURE — A9577 INJ MULTIHANCE: HCPCS | Performed by: NURSE PRACTITIONER

## 2024-10-14 PROCEDURE — 74183 MRI ABD W/O CNTR FLWD CNTR: CPT

## 2024-10-14 RX ADMIN — GADOBENATE DIMEGLUMINE 15 ML: 529 INJECTION, SOLUTION INTRAVENOUS at 10:30

## 2025-03-27 ENCOUNTER — OFFICE VISIT (OUTPATIENT)
Age: 75
End: 2025-03-27
Payer: MEDICARE

## 2025-03-27 ENCOUNTER — HOSPITAL ENCOUNTER (OUTPATIENT)
Facility: HOSPITAL | Age: 75
Setting detail: SPECIMEN
Discharge: HOME | End: 2025-03-30
Payer: MEDICARE

## 2025-03-27 VITALS
OXYGEN SATURATION: 99 % | WEIGHT: 160.6 LBS | SYSTOLIC BLOOD PRESSURE: 122 MMHG | DIASTOLIC BLOOD PRESSURE: 68 MMHG | TEMPERATURE: 97.7 F | HEART RATE: 75 BPM | HEIGHT: 67 IN | BODY MASS INDEX: 25.21 KG/M2

## 2025-03-27 DIAGNOSIS — B18.1 CHRONIC VIRAL HEPATITIS B WITHOUT DELTA AGENT AND WITHOUT COMA (HCC): Primary | ICD-10-CM

## 2025-03-27 DIAGNOSIS — B18.1 CHRONIC VIRAL HEPATITIS B WITHOUT DELTA AGENT AND WITHOUT COMA (HCC): ICD-10-CM

## 2025-03-27 LAB
ALBUMIN SERPL-MCNC: 3.8 G/DL (ref 3.4–5)
ALBUMIN/GLOB SERPL: 1.4 (ref 0.8–1.7)
ALP SERPL-CCNC: 122 U/L (ref 45–117)
ALT SERPL-CCNC: 23 U/L (ref 16–61)
ANION GAP SERPL CALC-SCNC: 6 MMOL/L (ref 3–18)
AST SERPL-CCNC: 12 U/L (ref 10–38)
BASOPHILS # BLD: 0.04 K/UL (ref 0–0.1)
BASOPHILS NFR BLD: 0.9 % (ref 0–2)
BILIRUB DIRECT SERPL-MCNC: 0.1 MG/DL (ref 0–0.2)
BILIRUB SERPL-MCNC: 0.4 MG/DL (ref 0.2–1)
BUN SERPL-MCNC: 25 MG/DL (ref 7–18)
BUN/CREAT SERPL: 26 (ref 12–20)
CALCIUM SERPL-MCNC: 8.9 MG/DL (ref 8.5–10.1)
CHLORIDE SERPL-SCNC: 105 MMOL/L (ref 100–111)
CO2 SERPL-SCNC: 29 MMOL/L (ref 21–32)
CREAT SERPL-MCNC: 0.95 MG/DL (ref 0.6–1.3)
DIFFERENTIAL METHOD BLD: ABNORMAL
EOSINOPHIL # BLD: 0.59 K/UL (ref 0–0.4)
EOSINOPHIL NFR BLD: 13.1 % (ref 0–5)
ERYTHROCYTE [DISTWIDTH] IN BLOOD BY AUTOMATED COUNT: 13.2 % (ref 11.6–14.5)
GLOBULIN SER CALC-MCNC: 2.8 G/DL (ref 2–4)
GLUCOSE SERPL-MCNC: 95 MG/DL (ref 74–99)
HCT VFR BLD AUTO: 41 % (ref 36–48)
HGB BLD-MCNC: 13.7 G/DL (ref 13–16)
IMM GRANULOCYTES # BLD AUTO: 0.03 K/UL (ref 0–0.04)
IMM GRANULOCYTES NFR BLD AUTO: 0.7 % (ref 0–0.5)
INR PPP: 0.9 (ref 0.9–1.1)
LYMPHOCYTES # BLD: 1.27 K/UL (ref 0.9–3.3)
LYMPHOCYTES NFR BLD: 28.2 % (ref 21–52)
MCH RBC QN AUTO: 34.4 PG (ref 24–34)
MCHC RBC AUTO-ENTMCNC: 33.4 G/DL (ref 31–37)
MCV RBC AUTO: 103 FL (ref 78–100)
MONOCYTES # BLD: 0.33 K/UL (ref 0.05–1.2)
MONOCYTES NFR BLD: 7.3 % (ref 3–10)
NEUTS SEG # BLD: 2.25 K/UL (ref 1.8–8)
NEUTS SEG NFR BLD: 49.8 % (ref 40–73)
NRBC # BLD: 0 K/UL (ref 0–0.01)
NRBC BLD-RTO: 0 PER 100 WBC
PLATELET # BLD AUTO: 150 K/UL (ref 135–420)
PMV BLD AUTO: 8.5 FL (ref 9.2–11.8)
POTASSIUM SERPL-SCNC: 3.8 MMOL/L (ref 3.5–5.5)
PROT SERPL-MCNC: 6.6 G/DL (ref 6.4–8.2)
PROTHROMBIN TIME: 12.3 SEC (ref 11.9–14.9)
RBC # BLD AUTO: 3.98 M/UL (ref 4.35–5.65)
SODIUM SERPL-SCNC: 140 MMOL/L (ref 136–145)
WBC # BLD AUTO: 4.5 K/UL (ref 4.6–13.2)

## 2025-03-27 PROCEDURE — 1126F AMNT PAIN NOTED NONE PRSNT: CPT | Performed by: NURSE PRACTITIONER

## 2025-03-27 PROCEDURE — 85025 COMPLETE CBC W/AUTO DIFF WBC: CPT

## 2025-03-27 PROCEDURE — 82107 ALPHA-FETOPROTEIN L3: CPT

## 2025-03-27 PROCEDURE — G8417 CALC BMI ABV UP PARAM F/U: HCPCS | Performed by: NURSE PRACTITIONER

## 2025-03-27 PROCEDURE — 1160F RVW MEDS BY RX/DR IN RCRD: CPT | Performed by: NURSE PRACTITIONER

## 2025-03-27 PROCEDURE — 80048 BASIC METABOLIC PNL TOTAL CA: CPT

## 2025-03-27 PROCEDURE — 80076 HEPATIC FUNCTION PANEL: CPT

## 2025-03-27 PROCEDURE — 1159F MED LIST DOCD IN RCRD: CPT | Performed by: NURSE PRACTITIONER

## 2025-03-27 PROCEDURE — 3074F SYST BP LT 130 MM HG: CPT | Performed by: NURSE PRACTITIONER

## 2025-03-27 PROCEDURE — G8427 DOCREV CUR MEDS BY ELIG CLIN: HCPCS | Performed by: NURSE PRACTITIONER

## 2025-03-27 PROCEDURE — 87517 HEPATITIS B DNA QUANT: CPT

## 2025-03-27 PROCEDURE — 3017F COLORECTAL CA SCREEN DOC REV: CPT | Performed by: NURSE PRACTITIONER

## 2025-03-27 PROCEDURE — 99214 OFFICE O/P EST MOD 30 MIN: CPT | Performed by: NURSE PRACTITIONER

## 2025-03-27 PROCEDURE — 85610 PROTHROMBIN TIME: CPT

## 2025-03-27 PROCEDURE — 1123F ACP DISCUSS/DSCN MKR DOCD: CPT | Performed by: NURSE PRACTITIONER

## 2025-03-27 PROCEDURE — 3078F DIAST BP <80 MM HG: CPT | Performed by: NURSE PRACTITIONER

## 2025-03-27 PROCEDURE — 1036F TOBACCO NON-USER: CPT | Performed by: NURSE PRACTITIONER

## 2025-03-27 PROCEDURE — 36415 COLL VENOUS BLD VENIPUNCTURE: CPT

## 2025-03-27 RX ORDER — TRAZODONE HYDROCHLORIDE 50 MG/1
50 TABLET ORAL NIGHTLY PRN
COMMUNITY
Start: 2025-02-28

## 2025-03-27 NOTE — PROGRESS NOTES
[facility]     Bret Rocha MD, FACP, MACG, FAASLD   MD Edda Rodríguez PA-C April S Ashworth, [facility]   Elsa Meredith, [facility]   Katyaverito Ghotra, FNP-C  Orlando Warner, [facility]   Kristina Barron, [facility]   at [facility]   [address], [address]   [place]  [address] [phone]   FAX: [phone]  [facility]   at [facility]   [address], [address]   [place]  [address] [phone]   FAX: [phone]         SUBJECTIVE:  Kadeem Gregorio returns to the [facility] for monitoring of chronic HBV.     The active problem list, all pertinent past medical history, medications, liver histology, endoscopic studies, radiologic findings and laboratory findings related to the liver disorder were reviewed with the patient.      Most recent imaging was performed with MRI in 10/2024.  Stable small arterial enhancing lesion in the left hepatic lobe without evidence of washout likely a perfusion variant or intrahepatic shunting.  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 No suspicious liver lesions identified.    The most recent laboratory studies indicate that the liver transaminases are normal, alkaline phosphatase is elevated, tests of hepatic synthetic and metabolic function are normal, and the platelet count is normal.      HBV is being treated with Viread.  HBV DNA in 09/2024 was undetectable.     The patient has no complaints which can be attributed to liver disease.    The patient completes all daily activities without any functional limitations. The patient has not experienced fatigue, fevers, chills, shortness of breath, chest pain, pain in the right side over the liver, diffuse abdominal pain, nausea, vomiting, constipation, diarrhrea, dry eyes, dry mouth,

## 2025-04-02 LAB
AFP L3 MFR SERPL: NORMAL % (ref 0–9.9)
AFP SERPL-MCNC: 2.5 NG/ML (ref 0–8.4)

## 2025-04-08 ENCOUNTER — HOSPITAL ENCOUNTER (OUTPATIENT)
Facility: HOSPITAL | Age: 75
Discharge: HOME | End: 2025-04-11
Payer: MEDICARE

## 2025-04-08 DIAGNOSIS — B18.1 CHRONIC VIRAL HEPATITIS B WITHOUT DELTA AGENT AND WITHOUT COMA (HCC): ICD-10-CM

## 2025-04-08 PROCEDURE — 74183 MRI ABD W/O CNTR FLWD CNTR: CPT

## 2025-04-08 PROCEDURE — A9577 INJ MULTIHANCE: HCPCS | Performed by: NURSE PRACTITIONER

## 2025-04-08 PROCEDURE — 6360000004 HC RX CONTRAST MEDICATION: Performed by: NURSE PRACTITIONER

## 2025-04-08 RX ADMIN — GADOBENATE DIMEGLUMINE 15 ML: 529 INJECTION, SOLUTION INTRAVENOUS at 09:54

## 2025-04-14 ENCOUNTER — RESULTS FOLLOW-UP (OUTPATIENT)
Age: 75
End: 2025-04-14

## 2025-08-04 ENCOUNTER — TELEPHONE (OUTPATIENT)
Age: 75
End: 2025-08-04

## 2025-08-04 DIAGNOSIS — B18.1 CHRONIC HEPATITIS B (HCC): Primary | ICD-10-CM

## 2025-08-04 RX ORDER — TENOFOVIR DISOPROXIL FUMARATE 300 MG/1
300 TABLET, FILM COATED ORAL DAILY
Qty: 90 TABLET | Refills: 3 | Status: ACTIVE | OUTPATIENT
Start: 2025-08-04 | End: 2026-08-04